# Patient Record
Sex: MALE | Race: WHITE | NOT HISPANIC OR LATINO | Employment: FULL TIME | URBAN - METROPOLITAN AREA
[De-identification: names, ages, dates, MRNs, and addresses within clinical notes are randomized per-mention and may not be internally consistent; named-entity substitution may affect disease eponyms.]

---
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
## 2019-10-24 ENCOUNTER — OFFICE VISIT (OUTPATIENT)
Dept: URGENT CARE | Facility: CLINIC | Age: 19
End: 2019-10-24
Payer: COMMERCIAL

## 2019-10-24 VITALS
HEIGHT: 68 IN | SYSTOLIC BLOOD PRESSURE: 126 MMHG | RESPIRATION RATE: 20 BRPM | HEART RATE: 92 BPM | WEIGHT: 188.6 LBS | DIASTOLIC BLOOD PRESSURE: 78 MMHG | BODY MASS INDEX: 28.58 KG/M2 | OXYGEN SATURATION: 100 % | TEMPERATURE: 98.3 F

## 2019-10-24 DIAGNOSIS — J02.9 EXUDATIVE PHARYNGITIS: Primary | ICD-10-CM

## 2019-10-24 PROCEDURE — 99214 OFFICE O/P EST MOD 30 MIN: CPT | Performed by: PHYSICIAN ASSISTANT

## 2019-10-24 RX ORDER — METHYLPREDNISOLONE 4 MG/1
TABLET ORAL
Qty: 21 TAB | Refills: 0 | Status: SHIPPED
Start: 2019-10-24 | End: 2020-09-14

## 2019-10-24 RX ORDER — AMOXICILLIN AND CLAVULANATE POTASSIUM 875; 125 MG/1; MG/1
1 TABLET, FILM COATED ORAL 2 TIMES DAILY
Qty: 14 TAB | Refills: 0 | Status: SHIPPED | OUTPATIENT
Start: 2019-10-24 | End: 2019-10-31

## 2019-10-24 ASSESSMENT — PATIENT HEALTH QUESTIONNAIRE - PHQ9: CLINICAL INTERPRETATION OF PHQ2 SCORE: 0

## 2019-10-24 NOTE — LETTER
October 24, 2019       Patient: Antonio Briones   YOB: 2000   Date of Visit: 10/24/2019         To Whom It May Concern:    It is my medical opinion that Antonio Briones may be excused from work for the dates of 10/24/19-10/25/19.      If you have any questions or concerns, please don't hesitate to call [phone]          Sincerely,          Wero Castro P.A.-C.  Electronically Signed

## 2019-10-25 NOTE — PROGRESS NOTES
Subjective:      PT is a 19 y.o. male who presents with Pharyngitis (white spots on his left side of tonsils, hard to swallow x 2 days)            HPI  This is a new problem. PT presents to  clinic today complaining of sore throat, with white spots on back of throat, pressure in ears, fatigue, runny nose. PT denies CP, SOB, NVD, abdominal pain, joint pain. PT states these symptoms began around 2 days ago. PT states the pain is a 7/10 with swallowing, aching in nature and worse at night.  Pt has not taken any RX medications for this condition. The pt's medication list, problem list, and allergies have been evaluated and reviewed during today's visit.      PMH:  Negative per pt.      PSH:  Negative per pt.      Fam Hx:  the patient's family history is not pertinent to their current complaint      Soc HX:  Social History     Socioeconomic History   • Marital status: Single     Spouse name: Not on file   • Number of children: Not on file   • Years of education: Not on file   • Highest education level: Not on file   Occupational History   • Not on file   Social Needs   • Financial resource strain: Not on file   • Food insecurity:     Worry: Not on file     Inability: Not on file   • Transportation needs:     Medical: Not on file     Non-medical: Not on file   Tobacco Use   • Smoking status: Current Every Day Smoker   • Smokeless tobacco: Never Used   Substance and Sexual Activity   • Alcohol use: No   • Drug use: Yes     Types: Marijuana   • Sexual activity: Yes     Partners: Male     Birth control/protection: None   Lifestyle   • Physical activity:     Days per week: Not on file     Minutes per session: Not on file   • Stress: Not on file   Relationships   • Social connections:     Talks on phone: Not on file     Gets together: Not on file     Attends Holiness service: Not on file     Active member of club or organization: Not on file     Attends meetings of clubs or organizations: Not on file     Relationship  "status: Not on file   • Intimate partner violence:     Fear of current or ex partner: Not on file     Emotionally abused: Not on file     Physically abused: Not on file     Forced sexual activity: Not on file   Other Topics Concern   • Not on file   Social History Narrative   • Not on file         Medications:    Current Outpatient Medications:   •  amoxicillin-clavulanate (AUGMENTIN) 875-125 MG Tab, Take 1 Tab by mouth 2 times a day for 7 days., Disp: 14 Tab, Rfl: 0  •  methylPREDNISolone (MEDROL DOSEPAK) 4 MG Tablet Therapy Pack, Follow schedule on package instructions., Disp: 21 Tab, Rfl: 0      Allergies:  Patient has no known allergies.    ROS    Constitutional: Positive for malaise/fatigue.   HENT: Positive for congestion and sore throat. Negative for ear pain.    Eyes: Negative for blurred vision, double vision and photophobia.   Respiratory: Positive for cough and sputum production. Negative for hemoptysis, shortness of breath and wheezing.    Cardiovascular: Negative for chest pain and palpitations.   Gastrointestinal: Negative for nausea, vomiting, abdominal pain, diarrhea and constipation.   Genitourinary: Negative for dysuria and flank pain.   Musculoskeletal: Negative for falls and myalgias.   Skin: Negative for itching and rash.   Neurological:  Negative for dizziness and tingling.   Endo/Heme/Allergies: Does not bruise/bleed easily.   Psychiatric/Behavioral: Negative for depression. The patient is not nervous/anxious.           Objective:     /78 (BP Location: Left arm, Patient Position: Sitting, BP Cuff Size: Adult)   Pulse 92   Temp 36.8 °C (98.3 °F) (Temporal)   Resp 20   Ht 1.721 m (5' 7.75\")   Wt 85.5 kg (188 lb 9.6 oz)   SpO2 100%   BMI 28.89 kg/m²      Physical Exam       Constitutional: PT is oriented to person, place, and time. PT appears well-developed and well-nourished. No distress.   HENT:   Head: Normocephalic and atraumatic.   Right Ear: Hearing, tympanic membrane, external " ear and ear canal normal.   Left Ear: Hearing, tympanic membrane, external ear and ear canal normal.   Nose: Mucosal edema, rhinorrhea and sinus tenderness present. Right sinus exhibits frontal sinus tenderness. Left sinus exhibits frontal sinus tenderness.   Mouth/Throat: Uvula is midline. Mucous membranes are pale. Posterior oropharyngeal edema and posterior oropharyngeal erythema with exudate noted on exam.   Eyes: Conjunctivae normal and EOM are normal. Pupils are equal, round, and reactive to light.   Neck: Normal range of motion. Neck supple. No thyromegaly present.   Cardiovascular: Normal rate, regular rhythm, normal heart sounds and intact distal pulses.  Exam reveals no gallop and no friction rub.    No murmur heard.  Pulmonary/Chest: Effort normal and breath sounds normal. No respiratory distress. PT has no wheezes. PT has no rales. PT exhibits no tenderness.   Abdominal: Soft. Bowel sounds are normal. PT exhibits no distension and no mass. There is no tenderness. There is no rebound and no guarding.   Musculoskeletal: Normal range of motion. PT exhibits no edema and no tenderness.   Lymphadenopathy:     PT has no cervical adenopathy.   Neurological: PT is alert and oriented to person, place, and time. PT displays normal reflexes. No cranial nerve deficit. PT exhibits normal muscle tone. Coordination normal.   Skin: Skin is warm and dry. No rash noted. No erythema.   Psychiatric: PT has a normal mood and affect. PT behavior is normal. Judgment and thought content normal.        Assessment/Plan:     1. Exudative pharyngitis  Back-up abx if symptoms do not improve in 2-3 days. PT on clinical presentation has exudate on oropharynx and it's possible beyond the strep A testing that this could be a different type of strep (C/G) or A. haemolyticum     - POCT Rapid Strep A-->NEG  - POCT Mononucleosis (mono)-->NEG  - amoxicillin-clavulanate (AUGMENTIN) 875-125 MG Tab; Take 1 Tab by mouth 2 times a day for 7 days.   Dispense: 14 Tab; Refill: 0  - methylPREDNISolone (MEDROL DOSEPAK) 4 MG Tablet Therapy Pack; Follow schedule on package instructions.  Dispense: 21 Tab; Refill: 0      Rest, fluids encouraged.  OTC decongestant for congestion  Note given for work.  AVS with medical info given.  Pt was in full understanding and agreement with the plan.  Differential diagnosis, natural history, supportive care, and indications for immediate follow-up discussed. All questions answered. Patient agrees with the plan of care.  Follow-up as needed if symptoms worsen or fail to improve.

## 2020-03-29 ENCOUNTER — OFFICE VISIT (OUTPATIENT)
Dept: URGENT CARE | Facility: CLINIC | Age: 20
End: 2020-03-29
Payer: COMMERCIAL

## 2020-03-29 VITALS
HEIGHT: 68 IN | WEIGHT: 153 LBS | SYSTOLIC BLOOD PRESSURE: 120 MMHG | DIASTOLIC BLOOD PRESSURE: 68 MMHG | HEART RATE: 101 BPM | OXYGEN SATURATION: 95 % | RESPIRATION RATE: 16 BRPM | TEMPERATURE: 98.8 F | BODY MASS INDEX: 23.19 KG/M2

## 2020-03-29 DIAGNOSIS — R05.9 COUGH: ICD-10-CM

## 2020-03-29 DIAGNOSIS — J02.9 SORE THROAT: ICD-10-CM

## 2020-03-29 DIAGNOSIS — J06.9 ACUTE URI: ICD-10-CM

## 2020-03-29 LAB
INT CON NEG: NEGATIVE
INT CON POS: POSITIVE
S PYO AG THROAT QL: NEGATIVE

## 2020-03-29 PROCEDURE — 99214 OFFICE O/P EST MOD 30 MIN: CPT | Performed by: NURSE PRACTITIONER

## 2020-03-29 PROCEDURE — 87880 STREP A ASSAY W/OPTIC: CPT | Performed by: NURSE PRACTITIONER

## 2020-03-29 ASSESSMENT — ENCOUNTER SYMPTOMS
RHINORRHEA: 1
CHILLS: 1
DIARRHEA: 1
SPUTUM PRODUCTION: 1
COUGH: 1
SINUS PAIN: 1

## 2020-03-29 NOTE — PROGRESS NOTES
Subjective:      Antonio Briones is a 19 y.o. male who presents with Diarrhea (sore throat, nausea, runny nose, diarrhea, couple days)    History reviewed. No pertinent past medical history.  Social History     Socioeconomic History   • Marital status: Single     Spouse name: Not on file   • Number of children: Not on file   • Years of education: Not on file   • Highest education level: Not on file   Occupational History   • Not on file   Social Needs   • Financial resource strain: Not on file   • Food insecurity     Worry: Not on file     Inability: Not on file   • Transportation needs     Medical: Not on file     Non-medical: Not on file   Tobacco Use   • Smoking status: Current Every Day Smoker   • Smokeless tobacco: Never Used   Substance and Sexual Activity   • Alcohol use: No   • Drug use: Yes     Types: Marijuana   • Sexual activity: Yes     Partners: Male     Birth control/protection: None   Lifestyle   • Physical activity     Days per week: Not on file     Minutes per session: Not on file   • Stress: Not on file   Relationships   • Social connections     Talks on phone: Not on file     Gets together: Not on file     Attends Holiness service: Not on file     Active member of club or organization: Not on file     Attends meetings of clubs or organizations: Not on file     Relationship status: Not on file   • Intimate partner violence     Fear of current or ex partner: Not on file     Emotionally abused: Not on file     Physically abused: Not on file     Forced sexual activity: Not on file   Other Topics Concern   • Not on file   Social History Narrative   • Not on file     History reviewed. No pertinent family history.    Allergies: Patient has no known allergies.    Patient is a 19-year-old male who presents today with complaint of mild sore throat with nasal congestion and dry cough and malaise.  Denies fever, body aches, or chills.  No shortness of breath.  He has had 2 episodes of diarrhea since  "the overall onset of symptoms which was 48 hours ago.  He denies any abdominal pain or cramping with this.  No nausea or vomiting.  He has been able to hydrate and is tolerating bland diet.        URI    This is a new problem. The current episode started in the past 7 days. The problem has been unchanged. There has been no fever. Associated symptoms include congestion, coughing, diarrhea, rhinorrhea and sinus pain. He has tried nothing for the symptoms. The treatment provided no relief.       Review of Systems   Constitutional: Positive for chills and malaise/fatigue.   HENT: Positive for congestion, rhinorrhea and sinus pain.    Respiratory: Positive for cough and sputum production.    Gastrointestinal: Positive for diarrhea.   Skin: Negative.    All other systems reviewed and are negative.         Objective:     /68   Pulse (!) 101   Temp 37.1 °C (98.8 °F) (Temporal)   Resp 16   Ht 1.727 m (5' 8\")   Wt 69.4 kg (153 lb)   SpO2 95%   BMI 23.26 kg/m²      Physical Exam  Constitutional:       Appearance: Normal appearance.   HENT:      Head: Normocephalic and atraumatic.      Right Ear: Tympanic membrane, ear canal and external ear normal.      Left Ear: Tympanic membrane, ear canal and external ear normal.      Nose: Congestion present.      Comments: Light yellow postnasal drainage noted.  No tenderness over the frontal or maxillary sinuses.     Mouth/Throat:      Mouth: Mucous membranes are moist.   Eyes:      Extraocular Movements: Extraocular movements intact.      Conjunctiva/sclera: Conjunctivae normal.      Pupils: Pupils are equal, round, and reactive to light.   Neck:      Musculoskeletal: Normal range of motion and neck supple.   Cardiovascular:      Rate and Rhythm: Normal rate and regular rhythm.      Heart sounds: Normal heart sounds.   Pulmonary:      Effort: Pulmonary effort is normal. No respiratory distress.      Breath sounds: Normal breath sounds. No stridor. No wheezing, rhonchi or " rales.   Chest:      Chest wall: No tenderness.   Musculoskeletal: Normal range of motion.   Skin:     General: Skin is warm and dry.   Neurological:      Mental Status: He is alert and oriented to person, place, and time.   Psychiatric:         Mood and Affect: Mood normal.         Behavior: Behavior normal.         Thought Content: Thought content normal.         Judgment: Judgment normal.       Point-of-care strep: Negative          Assessment/Plan:     1. Cough  2. Acute URI    Warm salt water gargles  Humidifier  Tylenol/Motrin as needed  Push fluids  Schuyler diet  Over-the-counter cough/cold medication of choice  Return for developing shortness of breath, worsening symptoms  Strict ER precautions for respiratory distress

## 2020-07-31 ENCOUNTER — TELEMEDICINE (OUTPATIENT)
Dept: TELEHEALTH | Facility: TELEMEDICINE | Age: 20
End: 2020-07-31
Payer: COMMERCIAL

## 2020-07-31 DIAGNOSIS — J02.9 PHARYNGITIS, UNSPECIFIED ETIOLOGY: ICD-10-CM

## 2020-07-31 PROCEDURE — 99203 OFFICE O/P NEW LOW 30 MIN: CPT | Mod: 95,CR | Performed by: PHYSICIAN ASSISTANT

## 2020-07-31 RX ORDER — PREDNISONE 10 MG/1
40 TABLET ORAL DAILY
Qty: 12 TAB | Refills: 0 | Status: SHIPPED | OUTPATIENT
Start: 2020-07-31 | End: 2020-08-03

## 2020-07-31 SDOH — HEALTH STABILITY: MENTAL HEALTH: HOW OFTEN DO YOU HAVE A DRINK CONTAINING ALCOHOL?: NEVER

## 2020-07-31 NOTE — PROGRESS NOTES
Telemedicine Visit: New Patient     This encounter was conducted via Zoom .   Verbal consent was obtained. Patient's identity was verified.    Subjective:     CC: sore throat  Antonio Briones is a 19 y.o. male presenting to establish care and to discuss the evaluation and management of three days of sore throat. It's been gradually progressing from mild to a moderate now.  Odynophagia to food, liquids.  No other constitutional symptoms.    ROS  See HPI  Constitutional: Negative for fever, chills and malaise/fatigue.   HENT: Negative for congestion.    Eyes: Negative for pain.   Respiratory: Negative for cough and shortness of breath.    Cardiovascular: Negative for leg swelling.   Gastrointestinal: Negative for nausea, vomiting, abdominal pain and diarrhea.   Genitourinary: Negative for dysuria and hematuria.   Skin: Negative for rash.   Neurological: Negative for dizziness, focal weakness and headaches.   Endo/Heme/Allergies: Does not bruise/bleed easily.   Psychiatric/Behavioral: Negative for depression.  The patient is not nervous/anxious.      Not on File    Current medicines (including changes today)  Current Outpatient Medications   Medication Sig Dispense Refill   • predniSONE (DELTASONE) 10 MG Tab Take 4 Tabs by mouth every day for 3 days. 12 Tab 0     No current facility-administered medications for this visit.        He  has no past medical history on file.  He  has no past surgical history on file.      History reviewed. No pertinent family history.  No family status information on file.       There are no active problems to display for this patient.         Objective:   There were no vitals taken for this visit.    Physical Exam:  Constitutional: Alert, no distress, well-groomed.  Skin: No rashes in visible areas.  Eye: Round. Conjunctiva clear, lids normal. No icterus.   ENMT: Lips pink without lesions, good dentition, moist mucous membranes. Phonation normal.  Neck: No masses, no thyromegaly. Moves  freely without pain.  CV: Pulse as reported by patient  Respiratory: Unlabored respiratory effort, no cough or audible wheeze  Psych: Alert and oriented x3, normal affect and mood.       Assessment and Plan:   The following treatment plan was discussed:     1. Pharyngitis, unspecified etiology  - predniSONE (DELTASONE) 10 MG Tab; Take 4 Tabs by mouth every day for 3 days.  Dispense: 12 Tab; Refill: 0    Age and presentation unlikely bacterial.  Will follow TOAST trial and give steroids.  Given return precautions for PTA/RPA if not improving in 48-72 hours needs to RTC    Follow-up: No follow-ups on file.

## 2020-09-04 ENCOUNTER — OFFICE VISIT (OUTPATIENT)
Dept: URGENT CARE | Facility: CLINIC | Age: 20
End: 2020-09-04
Payer: COMMERCIAL

## 2020-09-04 VITALS
DIASTOLIC BLOOD PRESSURE: 88 MMHG | WEIGHT: 200 LBS | HEIGHT: 68 IN | TEMPERATURE: 97.4 F | OXYGEN SATURATION: 99 % | SYSTOLIC BLOOD PRESSURE: 126 MMHG | HEART RATE: 83 BPM | BODY MASS INDEX: 30.31 KG/M2 | RESPIRATION RATE: 16 BRPM

## 2020-09-04 DIAGNOSIS — H10.31 ACUTE CONJUNCTIVITIS OF RIGHT EYE, UNSPECIFIED ACUTE CONJUNCTIVITIS TYPE: ICD-10-CM

## 2020-09-04 PROCEDURE — 99214 OFFICE O/P EST MOD 30 MIN: CPT | Performed by: NURSE PRACTITIONER

## 2020-09-04 RX ORDER — ERYTHROMYCIN 5 MG/G
1 OINTMENT OPHTHALMIC 4 TIMES DAILY
Qty: 3.5 G | Refills: 0 | Status: SHIPPED | OUTPATIENT
Start: 2020-09-04 | End: 2020-09-11

## 2020-09-04 ASSESSMENT — ENCOUNTER SYMPTOMS
FEVER: 0
BLURRED VISION: 1
EYE PAIN: 1
PHOTOPHOBIA: 1
EYE REDNESS: 1
EYE DISCHARGE: 0
EYE ITCHING: 0
DOUBLE VISION: 1
FOREIGN BODY SENSATION: 0

## 2020-09-05 NOTE — PROGRESS NOTES
"  Subjective:     Antonio Briones is a 19 y.o. male who presents for Eye Pain (redness and painfull, started last night )      Denies injury from foreign body. Pain, pressure in eye. Eyelid wender to touch initially. No itch. No exudate. Watery. Took Excedrine. Hx of right lazy eye, intermittent double vision, chronic. Slightly blurry, \"lazier more often than usual\".  No hx of similar symptoms.    Eye Injury   The right eye is affected. This is a new problem. The current episode started yesterday. The problem has been gradually worsening. There was no injury mechanism. The pain is at a severity of 0/10. The patient is experiencing no pain. There is no known exposure to pink eye. He does not wear contacts. Associated symptoms include blurred vision, double vision, eye redness and photophobia. Pertinent negatives include no eye discharge, fever, foreign body sensation, itching or recent URI. The treatment provided significant relief.       History reviewed. No pertinent past medical history.    History reviewed. No pertinent surgical history.    Social History     Socioeconomic History   • Marital status: Single     Spouse name: Not on file   • Number of children: Not on file   • Years of education: Not on file   • Highest education level: Not on file   Occupational History   • Not on file   Social Needs   • Financial resource strain: Not on file   • Food insecurity     Worry: Not on file     Inability: Not on file   • Transportation needs     Medical: Not on file     Non-medical: Not on file   Tobacco Use   • Smoking status: Current Every Day Smoker     Packs/day: 0.15     Years: 2.00     Pack years: 0.30     Types: Cigarettes   • Smokeless tobacco: Never Used   Substance and Sexual Activity   • Alcohol use: Never     Frequency: Never   • Drug use: Yes     Frequency: 7.0 times per week     Types: Marijuana   • Sexual activity: Yes     Partners: Male     Birth control/protection: None   Lifestyle   • Physical " "activity     Days per week: Not on file     Minutes per session: Not on file   • Stress: Not on file   Relationships   • Social connections     Talks on phone: Not on file     Gets together: Not on file     Attends Restoration service: Not on file     Active member of club or organization: Not on file     Attends meetings of clubs or organizations: Not on file     Relationship status: Not on file   • Intimate partner violence     Fear of current or ex partner: Not on file     Emotionally abused: Not on file     Physically abused: Not on file     Forced sexual activity: Not on file   Other Topics Concern   • Not on file   Social History Narrative    ** Merged History Encounter **             History reviewed. No pertinent family history.     No Known Allergies    Review of Systems   Constitutional: Negative for fever.   Eyes: Positive for blurred vision, double vision, photophobia, pain and redness. Negative for discharge and itching.   Neurological: Negative for focal weakness and headaches.   All other systems reviewed and are negative.       Objective:   /88   Pulse 83   Temp 36.3 °C (97.4 °F) (Temporal)   Resp 16   Ht 1.727 m (5' 8\")   Wt 90.7 kg (200 lb)   SpO2 99%   BMI 30.41 kg/m²     Physical Exam  Vitals signs reviewed.   Constitutional:       General: He is not in acute distress.     Appearance: He is well-developed.   HENT:      Head: Normocephalic and atraumatic.      Right Ear: External ear normal.      Left Ear: External ear normal.      Nose: Nose normal.   Eyes:      General: Lids are normal.         Right eye: No foreign body, discharge or hordeolum.      Extraocular Movements:      Right eye: Abnormal extraocular motion present. No nystagmus.      Left eye: Normal extraocular motion and no nystagmus.      Conjunctiva/sclera:      Right eye: Right conjunctiva is injected. No chemosis, exudate or hemorrhage.     Pupils: Pupils are equal, round, and reactive to light.      Right eye: No " "corneal abrasion or fluorescein uptake.      Comments: Slight lateral deviation of right eye initially with EOM.    Neck:      Musculoskeletal: Normal range of motion.   Cardiovascular:      Rate and Rhythm: Normal rate.   Pulmonary:      Effort: Pulmonary effort is normal.   Musculoskeletal: Normal range of motion.   Skin:     General: Skin is warm and dry.      Findings: No rash.   Neurological:      General: No focal deficit present.      Mental Status: He is alert and oriented to person, place, and time.      GCS: GCS eye subscore is 4. GCS verbal subscore is 5. GCS motor subscore is 6.   Psychiatric:         Mood and Affect: Mood normal.         Speech: Speech normal.         Behavior: Behavior normal.         Thought Content: Thought content normal.         Judgment: Judgment normal.         Assessment/Plan:   1. Acute conjunctivitis of right eye, unspecified acute conjunctivitis type  - REFERRAL TO OPHTHALMOLOGY  - erythromycin 5 MG/GM Ointment; Place 1 Application in right eye 4 times a day for 7 days.  Dispense: 3.5 g; Refill: 0  -Visual acuity.    Decrease in vision or right eye. Patient states it's been some time since he's had an eye exam; sating vision was 20/40 \"a while ago\". Had not noticed a significant change in right eye vision. Discussed concerns with acute red eye, no corneal abrasion noted, reported pressure in eye, with noted difference in vision. Discussed following up emergently vs next day with ophthalmology. Risks and benefits. Patient agrees to follow up with a specialist in the morning.    Follow up emergently for any worsening symptoms: increased redness or swelling, vision changes, decreased eye movement, or increased pain.    Differential diagnosis, natural history, supportive care, and indications for immediate follow-up discussed.  "

## 2020-09-05 NOTE — PATIENT INSTRUCTIONS
"Conjunctivitis  Conjunctivitis is commonly called \"pink eye.\" Conjunctivitis can be caused by bacterial or viral infection, allergies, or injuries. There is usually redness of the lining of the eye, itching, discomfort, and sometimes discharge. There may be deposits of matter along the eyelids. A viral infection usually causes a watery discharge, while a bacterial infection causes a yellowish, thick discharge. Pink eye is very contagious and spreads by direct contact.  You may be given antibiotic eyedrops as part of your treatment. Before using your eye medicine, remove all drainage from the eye by washing gently with warm water and cotton balls. Continue to use the medication until you have awakened 2 mornings in a row without discharge from the eye. Do not rub your eye. This increases the irritation and helps spread infection. Use separate towels from other household members. Wash your hands with soap and water before and after touching your eyes. Use cold compresses to reduce pain and sunglasses to relieve irritation from light. Do not wear contact lenses or wear eye makeup until the infection is gone.  SEEK MEDICAL CARE IF:   · Your symptoms are not better after 3 days of treatment.  · You have increased pain or trouble seeing.  · The outer eyelids become very red or swollen.  Document Released: 01/25/2006 Document Revised: 03/11/2013 Document Reviewed: 12/18/2006  [employer]® Patient Information ©2014 [employer] acquired by [employer].    Iritis  Iritis is an inflammation of the colored part of the eye (iris). Other parts at the front of the eye may also be inflamed. The iris is part of the middle layer of the eyeball which is called the uvea or the uveal track. Any part of the uveal track can become inflamed. The other portions of the uveal track are the choroid (the thin membrane under the outer layer of the eye), and the ciliary body (joins the choroid and the iris and produces the fluid in the front of the eye).   It is " "extremely important to treat iritis early, as it may lead to internal eye damage causing scarring or diseases such as glaucoma. Some people have only one attack of iritis (in one or both eyes) in their lifetime, while others may get it many times.  CAUSES  Iritis can be associated with many different diseases, but mostly occurs in otherwise healthy people. Examples of diseases that can be associated with iritis include:  · Diseases where the body's immune system attacks tissues within your own body (autoimmune diseases).  · Infections (tuberculosis, gonorrhea, fungus infections, Lyme disease, infection of the lining of the heart).  · Trauma or injury.  · Eye diseases (acute glaucoma and others).  · Inflammation from other parts of the uveal track.  · Severe eye infections.  · Other rare diseases.  SYMPTOMS  · Eye pain or aching.  · Sensitivity to light.  · Loss of sight or blurred vision.  · Redness of the eye. This is often accompanied by a ring of redness around the outside of the cornea, or clear covering at the front of the eye (ciliary flush).  · Excessive tearing of the eye(s).  · A small pupil that does not enlarge in the dark and stays smaller than the other eye's pupil.  · A whitish area that obscures the lower part of the colored circular iris. Sometimes this is visible when looking at the eye, where the whitish area has a \"fluid level\" or flat top. This is called a \"hypopyon\" and is actually pus inside the eye.  Since iritis causes the eye to become red, it is often confused with a much less dangerous form of \"pink eye\" or conjunctivitis. One of the most important symptoms is sensitivity to light. Anytime there is redness, discomfort in the eye(s) and extreme light sensitivity, it is extremely important to see an ophthalmologist as soon as possible.  TREATMENT  Acute iritis requires prompt medical evaluation by an eye specialist (ophthalmologist.) Treatment depends on the underlying cause but may " include:  · Corticosteroid eye drops and dilating eye drops. Follow your caregiver's exact instructions on taking and stopping corticosteroid medications (drops or pills).  · Occasionally, the iritis will be so severe that it will not respond to commonly used medications. If this happens, it may be necessary to use steroid injections. The injections are given under the eye's outer surface. Sometimes oral medications are given. The decision on treatment used for iritis is usually made on an individual basis.  HOME CARE INSTRUCTIONS  Your care giver will give specific instructions regarding the use of eye medications or other medications. Be certain to follow all instructions in both taking and stopping the medications.  SEEK IMMEDIATE MEDICAL CARE IF:  · You have redness of one or both eye.  · You experience a great deal of light sensitivity.  · You have pain or aching in either eye.  MAKE SURE YOU:   · Understand these instructions.  · Will watch your condition.  · Will get help right away if you are not doing well or get worse.  Document Released: 12/18/2006 Document Revised: 03/11/2013 Document Reviewed: 06/06/2008  [employer]® Patient Information ©2014 [employer].

## 2020-09-06 ASSESSMENT — ENCOUNTER SYMPTOMS
HEADACHES: 0
FOCAL WEAKNESS: 0

## 2020-09-14 ENCOUNTER — HOSPITAL ENCOUNTER (EMERGENCY)
Facility: MEDICAL CENTER | Age: 20
End: 2020-09-15
Attending: EMERGENCY MEDICINE
Payer: COMMERCIAL

## 2020-09-14 DIAGNOSIS — H57.11 EYE PAIN, RIGHT: ICD-10-CM

## 2020-09-14 DIAGNOSIS — H53.8 BLURRED VISION: ICD-10-CM

## 2020-09-14 PROCEDURE — 700101 HCHG RX REV CODE 250: Performed by: EMERGENCY MEDICINE

## 2020-09-14 PROCEDURE — 99283 EMERGENCY DEPT VISIT LOW MDM: CPT

## 2020-09-14 RX ORDER — PROPARACAINE HYDROCHLORIDE 5 MG/ML
2 SOLUTION/ DROPS OPHTHALMIC ONCE
Status: COMPLETED | OUTPATIENT
Start: 2020-09-15 | End: 2020-09-14

## 2020-09-14 RX ORDER — PROPARACAINE HYDROCHLORIDE 5 MG/ML
1 SOLUTION/ DROPS OPHTHALMIC ONCE
Status: CANCELLED | OUTPATIENT
Start: 2020-09-15 | End: 2020-09-15

## 2020-09-14 RX ADMIN — FLUORESCEIN SODIUM 1 MG: 1 STRIP OPHTHALMIC at 23:53

## 2020-09-14 RX ADMIN — PROPARACAINE HYDROCHLORIDE 2 DROP: 5 SOLUTION/ DROPS OPHTHALMIC at 23:52

## 2020-09-14 ASSESSMENT — ENCOUNTER SYMPTOMS
EYE PAIN: 1
BLURRED VISION: 1
PHOTOPHOBIA: 1
EYE REDNESS: 1

## 2020-09-15 VITALS
WEIGHT: 207.01 LBS | HEIGHT: 68 IN | TEMPERATURE: 97.7 F | HEART RATE: 71 BPM | DIASTOLIC BLOOD PRESSURE: 76 MMHG | BODY MASS INDEX: 31.37 KG/M2 | OXYGEN SATURATION: 97 % | SYSTOLIC BLOOD PRESSURE: 138 MMHG | RESPIRATION RATE: 18 BRPM

## 2020-09-15 ASSESSMENT — ENCOUNTER SYMPTOMS
HEADACHES: 0
VOMITING: 0
DIZZINESS: 0
FEVER: 0
NAUSEA: 0

## 2020-09-15 NOTE — ED TRIAGE NOTES
"Chief Complaint   Patient presents with   • Eye Pain     red and irritated for the past week. patient was seen at urgent care on friday and was given ABX eye drops with no relief of symptoms. patient was told to come to the ER for worsening pain. eye is sensitive to light   • Blurred Vision     x 2 days     Pt amb to triage with steady gait for above complaint.     Pt is alert and oriented, speaking in full sentences, follows commands and responds appropriately to questions. NAD. Resp are even and unlabored.     Pt placed in lobby. Pt educated on triage process. Pt encouraged to alert staff for any changes.    Blood Pressure: 141/88, Pulse: 77, Respiration: 16, Temperature: 36.4 °C (97.5 °F), Height: 172.7 cm (5' 8\"), Weight: 93.9 kg (207 lb 0.2 oz), BMI (Calculated): 31.48, BSA (Calculated): 2.1, Pulse Oximetry: 98 %      "

## 2020-09-15 NOTE — ED NOTES
Pt discharged home. Explained discharge instructions. Questions and comments addressed. Pt verbalized understanding of instructions. Pt advised to follow-up with Ophthalmologist or return to ED for any new or worsening of symptoms. Pt is ambulating well and steady on feet. VS stable. Pt's friend at bedside and will be driving pt home.

## 2020-09-15 NOTE — ED PROVIDER NOTES
ED Provider Note    Scribed for Violet Dennison M.D. by Radha Payne. 9/14/2020, 11:44 PM.    Means of arrival: Walk-in  History obtained from: Patient   History limited by: None     CHIEF COMPLAINT  Chief Complaint   Patient presents with   • Eye Pain     red and irritated for the past week. patient was seen at urgent care on friday and was given ABX eye drops with no relief of symptoms. patient was told to come to the ER for worsening pain. eye is sensitive to light   • Blurred Vision     x 2 days     PPE Note: I personally donned full PPE for all patient encounters during this visit, including wearing an N95 respirator mask and gloves. Scribe remained outside the patient's room and did not have any contact with the patient for the duration of patient encounter.      SHAY Briones is a 19 y.o. male who presents to the Emergency Department for right eye irritation onset 2 weeks ago. He has associated redness, photophobia, and blurred vision in the right eye. He was seen at urgent care  and was given erythromycin ointment for conjunctivitis, he completed the course but this did not alleviate his pain. He states he has prescription glasses for distance, but he does not wear them. He has a history of strabismus in the right eye, has never had eye surgery.  He does not wear contacts.  He denies pain with movement of the eye. He also denies being exposed to any chemicals.  He does not work with metals and denies foreign body sensation.  He does not believe that he got anything in his eye when the discomfort started.  Denies any eye trauma.      REVIEW OF SYSTEMS  Review of Systems   Constitutional: Negative for fever.   Eyes: Positive for blurred vision, photophobia, pain (irritation) and redness.   Gastrointestinal: Negative for nausea and vomiting.   Neurological: Negative for dizziness and headaches.   All other systems reviewed and are negative.    PAST MEDICAL HISTORY   None pertinent.     SURGICAL  "HISTORY  patient denies any surgical history    SOCIAL HISTORY  Social History     Tobacco Use   • Smoking status: Current Every Day Smoker     Packs/day: 0.15     Years: 2.00     Pack years: 0.30     Types: Cigarettes   • Smokeless tobacco: Never Used   Substance Use Topics   • Alcohol use: Never     Frequency: Never   • Drug use: Yes     Frequency: 7.0 times per week     Types: Marijuana      Social History     Substance and Sexual Activity   Drug Use Yes   • Frequency: 7.0 times per week   • Types: Marijuana       FAMILY HISTORY  History reviewed. No pertinent family history.    CURRENT MEDICATIONS  Home Medications     Reviewed by Jesika Tee R.N. (Registered Nurse) on 09/14/20 at 2255  Med List Status: Complete   Medication Last Dose Status        Patient Lemuel Taking any Medications                       ALLERGIES  No Known Allergies    PHYSICAL EXAM  VITAL SIGNS: /88   Pulse 77   Temp 36.4 °C (97.5 °F) (Oral)   Resp 16   Ht 1.727 m (5' 8\")   Wt 93.9 kg (207 lb 0.2 oz)   SpO2 98%   BMI 31.48 kg/m²   Vitals reviewed by myself.  Physical Exam  Nursing note and vitals reviewed.  Constitutional: Well-developed and well-nourished. No acute distress.   HENT: Head is normocephalic and atraumatic.  Eyes: Patient has intact extraocular movements, no pain with extraocular movements, pupils are equal and reactive to light bilaterally, visual acuity is 20/20 left eye, 20/100 right eye and 20/15 both eyes.  Right conjunctiv is injected and erythematous.  Fluorescein staining reveals no uptake of the dye, negative Lorenzo sign.  Pressure is 20 in the right eye and 19 in the left eye.  Cardiovascular: regular rate and  regular rhythm. No murmur heard.  Pulmonary/Chest: Breath sounds normal.  Normal effort  Musculoskeletal: Extremities exhibit normal range of motion without edema or tenderness.   Neurological: Awake and alert  Skin: Skin is warm and dry.     REASSESSMENT    11:50 PM - Evaluated patient at " bedside. Treated patient for pain.      12:10 AM -  I discussed the patient's case and the above findings with Dr. Paulino (Opthamology) who recommended discharge at this time and to follow up outpatient.     12:14 AM - Patient was reevaluated at bedside. Discussed my conversation with Dr. Paulino. I also discussed with the patient the plan for discharge at this time. Patient verbalizes understanding and agreement to this plan of care.     COURSE & MEDICAL DECISION MAKING  Nursing notes, VS, PMSFHx reviewed in chart.    Patient is a 19-year-old male who comes in for evaluation of right eye pain.  Differential diagnosis includes conjunctivitis, uveitis, iritis, scleritis, episcleritis.  On my exam patient is well-appearing with vitals in normal limits.  Visual acuity is noted to be decreased in the right eye, however eye exam is otherwise unremarkable.  Patient's light sensitivity and eye discomfort resolved with proparacaine administration.  Patient has no pain with extraocular movements making periorbital cellulitis unlikely.  He has no trauma to the eye making iritis less likely.  Discussed the case with Dr. Paulino from ophthalmology who does not advise starting medication at this time.  He will see patient in clinic tomorrow and initiate therapy then.  In the meantime patient is advised to use refresh moisturizing drops for any discomfort and over-the-counter analgesics.  Patient is amenable to this plan.  He is given strict return precautions and discharged in stable condition.    The patient will return for new or worsening symptoms and is stable at the time of discharge.    The patient is referred to a primary physician for blood pressure management, diabetic screening, and for all other preventative health concerns.    DISPOSITION:  Patient will be discharged home in stable condition.    FOLLOW UP:  Cliff Paulino M.D.  [address]  [place] [address]  [phone]        FINAL IMPRESSION  1. Blurred  vision    2. Eye pain, right          IRadha (Scribe), am scribing for, and in the presence of, Violet Dennison M.D..    Electronically signed by: Radha Payne (Scribe), 9/14/2020    IViolet M.D. personally performed the services described in this documentation, as scribed by Radha Payne in my presence, and it is both accurate and complete.    The note accurately reflects work and decisions made by me.  Violet Dennison M.D.  9/15/2020  2:04 AM

## 2020-09-15 NOTE — ED NOTES
Pt ambulatory to [facility]. Pt changed into gown and placed on monitor.  Agree with triage note. Pt c/o pain and irritation to RIGHT eye, sclera noted to be reddened. Pt report waking up with RIGHT eye irritation, denies any trauma or known foreign object in eye.   Visual acuity completed: LEFT- 20/20 RIGHT-90/100 Both eyes- 20/15  Chart up and ready for ERP now.

## 2021-04-17 ENCOUNTER — OFFICE VISIT (OUTPATIENT)
Dept: URGENT CARE | Facility: CLINIC | Age: 21
End: 2021-04-17
Payer: COMMERCIAL

## 2021-04-17 VITALS
SYSTOLIC BLOOD PRESSURE: 128 MMHG | HEART RATE: 87 BPM | TEMPERATURE: 98.6 F | WEIGHT: 197 LBS | BODY MASS INDEX: 29.18 KG/M2 | HEIGHT: 69 IN | DIASTOLIC BLOOD PRESSURE: 88 MMHG | RESPIRATION RATE: 16 BRPM | OXYGEN SATURATION: 100 %

## 2021-04-17 DIAGNOSIS — H10.32 ACUTE CONJUNCTIVITIS OF LEFT EYE, UNSPECIFIED ACUTE CONJUNCTIVITIS TYPE: ICD-10-CM

## 2021-04-17 PROCEDURE — 99214 OFFICE O/P EST MOD 30 MIN: CPT | Performed by: PHYSICIAN ASSISTANT

## 2021-04-17 RX ORDER — POLYMYXIN B SULFATE AND TRIMETHOPRIM 1; 10000 MG/ML; [USP'U]/ML
1 SOLUTION OPHTHALMIC EVERY 4 HOURS
Qty: 10 ML | Refills: 0 | Status: SHIPPED | OUTPATIENT
Start: 2021-04-17 | End: 2022-03-31

## 2021-04-17 ASSESSMENT — ENCOUNTER SYMPTOMS
CHILLS: 0
PHOTOPHOBIA: 1
EYE REDNESS: 1
CARDIOVASCULAR NEGATIVE: 1
BLURRED VISION: 0
EYE DISCHARGE: 1
DOUBLE VISION: 0
EYE PAIN: 1
FEVER: 0
RESPIRATORY NEGATIVE: 1

## 2021-04-17 ASSESSMENT — VISUAL ACUITY: OU: 1

## 2021-04-17 NOTE — PROGRESS NOTES
Subjective:   Antonio Briones is a 20 y.o. male who presents for Eye Problem (x 4 days, L eye pain, watery, pinpoint pupil, photosensitivity, blurry vision.)    HPI  Patient is a 20-year-old male who presents with a red eye onset 3 days ago.  He states initially it was a mild redness and irritation located to the lateral eye with gradual worsening redness, irritation, and clear drainage.  He denies any trauma or injury.  He denies any foreign body sensation.  He does report of some discomfort and occasional pain.  He has some photosensitivity.  His symptoms are somewhat relieved with warm compresses.  He has cloudy vision secondary to drainage but denies any other vision changes.  He does not wear eye contacts.  He usually wears eyeglasses but he does not have them with him here in the clinic.  Denies any fever, chills.  History of similar red eye in the past and he had a full evaluation by an eye doctor which showed no glaucoma or any other concerns.  He was placed on antibiotics at the time and his symptoms resolved.      Review of Systems   Constitutional: Negative for chills and fever.   HENT: Negative for congestion.    Eyes: Positive for photophobia, pain, discharge and redness. Negative for blurred vision and double vision.   Respiratory: Negative.    Cardiovascular: Negative.        Medications:    • This patient does not have an active medication from one of the medication groupers.    Allergies: Patient has no known allergies.    Problem List: Antonio Briones does not have a problem list on file.    Surgical History:  No past surgical history on file.    Past Social Hx: Antonio Briones  reports that he has been smoking cigarettes. He has a 0.30 pack-year smoking history. He has never used smokeless tobacco. He reports current alcohol use. He reports current drug use. Frequency: 7.00 times per week. Drug: Marijuana.     Past Family Hx:  Antonio Briones family history is not on file.  "    Problem list, medications, and allergies reviewed by myself today in Epic.     Objective:     /88 (BP Location: Left arm, Patient Position: Sitting, BP Cuff Size: Adult)   Pulse 87   Temp 37 °C (98.6 °F) (Temporal)   Resp 16   Ht 1.753 m (5' 9\")   Wt 89.4 kg (197 lb)   SpO2 100%   BMI 29.09 kg/m²     Vision Screening on 4/17/2021  Edited by: Wicho Mar, Med Ass't   Right eye Left eye Both eyes   Without correction 20 70 20 20       Vision Screening on 9/4/2020  Edited by: Gardenia Campos, Med Ass't   Right eye Left eye Both eyes   Without correction 20/100 20/15 20/20          Physical Exam  Vitals reviewed.   Constitutional:       General: He is not in acute distress.     Appearance: Normal appearance. He is not ill-appearing or toxic-appearing.   Eyes:      General: Lids are normal. Vision grossly intact.         Left eye: Discharge (clear) present.No foreign body or hordeolum.      Extraocular Movements: Extraocular movements intact.      Conjunctiva/sclera:      Left eye: Left conjunctiva is injected. No chemosis, exudate or hemorrhage.     Pupils: Pupils are equal, round, and reactive to light.      Left eye: No corneal abrasion or fluorescein uptake. Lorenzo exam negative.  Cardiovascular:      Rate and Rhythm: Normal rate.   Pulmonary:      Effort: Pulmonary effort is normal.   Musculoskeletal:      Cervical back: Neck supple.   Lymphadenopathy:      Cervical: No cervical adenopathy.   Skin:     General: Skin is warm and dry.   Neurological:      General: No focal deficit present.      Mental Status: He is alert and oriented to person, place, and time.   Psychiatric:         Mood and Affect: Mood normal.         Behavior: Behavior normal.         Assessment/Associated Orders     1. Acute conjunctivitis of left eye, unspecified acute conjunctivitis type  polymixin-trimethoprim (POLYTRIM) 83257-7.1 UNIT/ML-% Solution       Medical Decision Making      This is a pleasant 20-year-old male who " presents with 3 days of a left red eye with irritation and clear drainage.  No trauma or injury.  Exam shows conjunctivitis with no fluorescein uptake or other signs of corneal abrasion or foreign body.  Visual acuity is non revealing (see above).  Discussed differentials such as bacterial conjunctivitis, viral conjunctivitis, scleritis.     Due to history of similar issue in the past resolved with antibiotics, we will treat with antibiotic eyedrops.  He was instructed to follow-up closely with an eye doctor such as [facility] or the ophthalmologist he had previously seen.  Strict precautions discussed such as if no improvement in 24 to 48 hours or any worsening present immediately to an eye doctor or to the emergency room especially if any vision changes, worsening redness, pain, fevers, chills or any other concerns.    I personally reviewed prior external notes and test results pertinent to today's visit. Red flags discussed and indications to immediately call 911 or present to the Emergency Department.   Supportive care, differential diagnoses, and indications for immediate follow-up discussed with patient.    Patient expresses understanding and agrees to plan. Patient denies any other questions or concerns.     Advised the patient to follow-up with the primary care physician for recheck, reevaluation, and consideration of further management.    My total time spent caring for the patient on the day of the encounter that included review of prior records, obtaining history, examination, discussion of plan and return precautions was at least 30 minutes.     Please note that this dictation was created using voice recognition software. I have made a reasonable attempt to correct obvious errors, but I expect that there are errors of grammar and possibly content that I did not discover before finalizing the note.    This note was electronically signed by Khari Collado PA-C

## 2021-04-19 ENCOUNTER — HOSPITAL ENCOUNTER (EMERGENCY)
Facility: MEDICAL CENTER | Age: 21
End: 2021-04-19
Attending: EMERGENCY MEDICINE
Payer: COMMERCIAL

## 2021-04-19 VITALS
HEIGHT: 69 IN | DIASTOLIC BLOOD PRESSURE: 89 MMHG | OXYGEN SATURATION: 97 % | BODY MASS INDEX: 29.29 KG/M2 | WEIGHT: 197.75 LBS | HEART RATE: 80 BPM | RESPIRATION RATE: 18 BRPM | TEMPERATURE: 97.5 F | SYSTOLIC BLOOD PRESSURE: 138 MMHG

## 2021-04-19 DIAGNOSIS — H57.12 LEFT EYE PAIN: ICD-10-CM

## 2021-04-19 PROCEDURE — 99283 EMERGENCY DEPT VISIT LOW MDM: CPT

## 2021-04-19 PROCEDURE — 700101 HCHG RX REV CODE 250: Performed by: EMERGENCY MEDICINE

## 2021-04-19 RX ORDER — PROPARACAINE HYDROCHLORIDE 5 MG/ML
1 SOLUTION/ DROPS OPHTHALMIC ONCE
Status: COMPLETED | OUTPATIENT
Start: 2021-04-19 | End: 2021-04-19

## 2021-04-19 RX ADMIN — FLUORESCEIN SODIUM 1 MG: 1 STRIP OPHTHALMIC at 06:50

## 2021-04-19 RX ADMIN — PROPARACAINE HYDROCHLORIDE 1 DROP: 5 SOLUTION/ DROPS OPHTHALMIC at 06:49

## 2021-04-19 ASSESSMENT — LIFESTYLE VARIABLES
TOTAL SCORE: 0
HAVE YOU EVER FELT YOU SHOULD CUT DOWN ON YOUR DRINKING: NO
DO YOU DRINK ALCOHOL: YES
EVER HAD A DRINK FIRST THING IN THE MORNING TO STEADY YOUR NERVES TO GET RID OF A HANGOVER: NO
ON A TYPICAL DAY WHEN YOU DRINK ALCOHOL HOW MANY DRINKS DO YOU HAVE: 1
TOTAL SCORE: 0
TOTAL SCORE: 0
AVERAGE NUMBER OF DAYS PER WEEK YOU HAVE A DRINK CONTAINING ALCOHOL: 0
HAVE PEOPLE ANNOYED YOU BY CRITICIZING YOUR DRINKING: NO
HOW MANY TIMES IN THE PAST YEAR HAVE YOU HAD 5 OR MORE DRINKS IN A DAY: 0
CONSUMPTION TOTAL: NEGATIVE
EVER FELT BAD OR GUILTY ABOUT YOUR DRINKING: NO

## 2021-04-19 NOTE — ED NOTES
0700 Report received from vidya  , assumed care at this time.  Pt evaluated , pt to be d/c home  0725 D/c pt home, no rx given . Pt aware of f/u instructions with ophtalmology  , aware to return for any changes or concerns. No further questions upon d/c home from ed

## 2021-04-19 NOTE — DISCHARGE INSTRUCTIONS
Please contact Dr. Lancaster for an eye appointment today.  Return for new or concerning symptoms in the interim.

## 2021-04-19 NOTE — ED TRIAGE NOTES
"Chief Complaint   Patient presents with   • Eye Pain     5 days of worsening left eye pain, redness.     ED Triage Vitals [04/19/21 0637]   Enc Vitals Group      Blood Pressure 144/96      Pulse 82      Respiration 18      Temperature 36.4 °C (97.5 °F)      Temp src Temporal      Pulse Oximetry 98 %      Weight 89.7 kg (197 lb 12 oz)      Height 1.753 m (5' 9\")     "

## 2021-04-19 NOTE — ED PROVIDER NOTES
CHIEF COMPLAINT  Chief Complaint   Patient presents with   • Eye Pain     5 days of worsening left eye pain, redness.       HPI  Antonio Briones is a 20 y.o. male who presents with a red painful eye with minimal clear drainage and minimal eye swelling over the last 5 days.  He was started on antibiotics topically through urgent care a few days ago-he presents with ongoing pain.  He notes some blurred vision on that side.  He does note some photophobia.  He denies any trauma or any obvious foreign body that he was aware of.  Nothing seems to make his symptoms better.    REVIEW OF SYSTEMS  No fever.  No definite foreign body.    PAST MEDICAL HISTORY  History reviewed. No pertinent past medical history.    FAMILY HISTORY  No family history on file.    SOCIAL HISTORY  Social History     Socioeconomic History   • Marital status: Single     Spouse name: Not on file   • Number of children: Not on file   • Years of education: Not on file   • Highest education level: Not on file   Occupational History   • Not on file   Tobacco Use   • Smoking status: Current Every Day Smoker     Packs/day: 0.15     Years: 2.00     Pack years: 0.30     Types: Cigarettes   • Smokeless tobacco: Never Used   Substance and Sexual Activity   • Alcohol use: Yes   • Drug use: Yes     Frequency: 7.0 times per week     Types: Marijuana   • Sexual activity: Yes     Partners: Male     Birth control/protection: None   Other Topics Concern   • Not on file   Social History Narrative    ** Merged History Encounter **          Social Determinants of Health     Financial Resource Strain:    • Difficulty of Paying Living Expenses:    Food Insecurity:    • Worried About Running Out of Food in the Last Year:    • Ran Out of Food in the Last Year:    Transportation Needs:    • Lack of Transportation (Medical):    • Lack of Transportation (Non-Medical):    Physical Activity:    • Days of Exercise per Week:    • Minutes of Exercise per Session:    Stress:   "  • Feeling of Stress :    Social Connections:    • Frequency of Communication with Friends and Family:    • Frequency of Social Gatherings with Friends and Family:    • Attends Gn[employer] Services:    • Active Member of Clubs or Organizations:    • Attends Club or Organization Meetings:    • Marital Status:    Intimate Partner Violence:    • Fear of Current or Ex-Partner:    • Emotionally Abused:    • Physically Abused:    • Sexually Abused:        SURGICAL HISTORY  History reviewed. No pertinent surgical history.    CURRENT MEDICATIONS  Home Medications     Reviewed by Rigo Friend R.N. (Registered Nurse) on 04/19/21 at 0656  Med List Status: <None>   Medication Last Dose Status   polymixin-trimethoprim (POLYTRIM) 90014-6.1 UNIT/ML-% Solution  Active                ALLERGIES  No Known Allergies    PHYSICAL EXAM  VITAL SIGNS: /96   Pulse 82   Temp 36.4 °C (97.5 °F) (Temporal)   Resp 18   Ht 1.753 m (5' 9\")   Wt 89.7 kg (197 lb 12 oz)   SpO2 98%   BMI 29.20 kg/m²      Constitutional: Well developed, Well nourished, No acute distress, Non-toxic appearance.   HENT: Normocephalic, Atraumatic, slit-lamp exam demonstrates and injected conjunctiva, there is a slightly miotic pupil which is minimally reactive, there may be some mild flare in the anterior chamber no definite cells appreciated, no hyphema or hypopyon, fluorescein staining demonstrates no significant uptake, Samy-Pen pressure is 18, extraocular movements are intact  Cardiovascular: Regular pulse  Lungs: No respiratory distress  Skin: Warm, Dry, no rash  Extremities: No edema  Neurologic: Alert, appropriate, follows commands  Psychiatric: Affect normal    COURSE & MEDICAL DECISION MAKING  Pertinent Labs & Imaging studies reviewed. (See chart for details)  This is a 20-year-old male who presents with a red painful eye-visual acuity is 20/50 bilaterally, 20/70 on the right and 20/40 on the left.  He does not have any evidence of glaucoma with " Samy-Pen pressure of 18.  I do not see any evidence of foreign body.  There is no corneal abrasion that I can see.  Iritis is a consideration-the case was discussed with Dr. Lancaster over the phone who agreed to follow-up with the patient.  The patient is already on antibiotics and will continue those.    FINAL IMPRESSION  1. Painful eye  2.   3.         Electronically signed by: Adrian Green M.D., 4/19/2021 6:58 AM

## 2022-03-31 ENCOUNTER — HOSPITAL ENCOUNTER (EMERGENCY)
Facility: MEDICAL CENTER | Age: 22
End: 2022-03-31
Attending: EMERGENCY MEDICINE
Payer: COMMERCIAL

## 2022-03-31 ENCOUNTER — TELEPHONE (OUTPATIENT)
Dept: URGENT CARE | Facility: CLINIC | Age: 22
End: 2022-03-31

## 2022-03-31 ENCOUNTER — HOSPITAL ENCOUNTER (OUTPATIENT)
Dept: LAB | Facility: MEDICAL CENTER | Age: 22
End: 2022-03-31
Attending: FAMILY MEDICINE
Payer: COMMERCIAL

## 2022-03-31 ENCOUNTER — HOSPITAL ENCOUNTER (OUTPATIENT)
Facility: MEDICAL CENTER | Age: 22
End: 2022-03-31
Attending: FAMILY MEDICINE
Payer: COMMERCIAL

## 2022-03-31 ENCOUNTER — OFFICE VISIT (OUTPATIENT)
Dept: URGENT CARE | Facility: CLINIC | Age: 22
End: 2022-03-31
Payer: COMMERCIAL

## 2022-03-31 VITALS
OXYGEN SATURATION: 98 % | DIASTOLIC BLOOD PRESSURE: 80 MMHG | BODY MASS INDEX: 29.25 KG/M2 | SYSTOLIC BLOOD PRESSURE: 120 MMHG | HEIGHT: 68 IN | TEMPERATURE: 97.1 F | HEART RATE: 102 BPM | RESPIRATION RATE: 16 BRPM | WEIGHT: 193 LBS

## 2022-03-31 VITALS
HEIGHT: 66 IN | HEART RATE: 102 BPM | WEIGHT: 191.14 LBS | RESPIRATION RATE: 18 BRPM | TEMPERATURE: 98.6 F | OXYGEN SATURATION: 98 % | BODY MASS INDEX: 30.72 KG/M2 | DIASTOLIC BLOOD PRESSURE: 95 MMHG | SYSTOLIC BLOOD PRESSURE: 141 MMHG

## 2022-03-31 DIAGNOSIS — R80.9 PROTEINURIA, UNSPECIFIED TYPE: ICD-10-CM

## 2022-03-31 DIAGNOSIS — R10.13 INTERMITTENT EPIGASTRIC ABDOMINAL PAIN: ICD-10-CM

## 2022-03-31 DIAGNOSIS — R19.7 DIARRHEA, UNSPECIFIED TYPE: ICD-10-CM

## 2022-03-31 DIAGNOSIS — R68.89 FLU-LIKE SYMPTOMS: ICD-10-CM

## 2022-03-31 LAB
ALBUMIN SERPL BCP-MCNC: 4.6 G/DL (ref 3.2–4.9)
ALBUMIN SERPL BCP-MCNC: 4.7 G/DL (ref 3.2–4.9)
ALBUMIN/GLOB SERPL: 1.2 G/DL
ALBUMIN/GLOB SERPL: 1.5 G/DL
ALP SERPL-CCNC: 65 U/L (ref 30–99)
ALP SERPL-CCNC: 66 U/L (ref 30–99)
ALT SERPL-CCNC: 23 U/L (ref 2–50)
ALT SERPL-CCNC: 24 U/L (ref 2–50)
ANION GAP SERPL CALC-SCNC: 14 MMOL/L (ref 7–16)
ANION GAP SERPL CALC-SCNC: 15 MMOL/L (ref 7–16)
APPEARANCE UR: CLEAR
AST SERPL-CCNC: 23 U/L (ref 12–45)
AST SERPL-CCNC: 26 U/L (ref 12–45)
BASOPHILS # BLD AUTO: 0.5 % (ref 0–1.8)
BASOPHILS # BLD AUTO: 0.5 % (ref 0–1.8)
BASOPHILS # BLD: 0.05 K/UL (ref 0–0.12)
BASOPHILS # BLD: 0.06 K/UL (ref 0–0.12)
BILIRUB SERPL-MCNC: 0.6 MG/DL (ref 0.1–1.5)
BILIRUB SERPL-MCNC: 0.6 MG/DL (ref 0.1–1.5)
BILIRUB UR STRIP-MCNC: NORMAL MG/DL
BUN SERPL-MCNC: 11 MG/DL (ref 8–22)
BUN SERPL-MCNC: 12 MG/DL (ref 8–22)
CALCIUM SERPL-MCNC: 9.3 MG/DL (ref 8.5–10.5)
CALCIUM SERPL-MCNC: 9.7 MG/DL (ref 8.5–10.5)
CHLORIDE SERPL-SCNC: 95 MMOL/L (ref 96–112)
CHLORIDE SERPL-SCNC: 96 MMOL/L (ref 96–112)
CO2 SERPL-SCNC: 26 MMOL/L (ref 20–33)
CO2 SERPL-SCNC: 27 MMOL/L (ref 20–33)
COLOR UR AUTO: NORMAL
CREAT SERPL-MCNC: 0.86 MG/DL (ref 0.5–1.4)
CREAT SERPL-MCNC: 0.89 MG/DL (ref 0.5–1.4)
EOSINOPHIL # BLD AUTO: 0.03 K/UL (ref 0–0.51)
EOSINOPHIL # BLD AUTO: 0.03 K/UL (ref 0–0.51)
EOSINOPHIL NFR BLD: 0.2 % (ref 0–6.9)
EOSINOPHIL NFR BLD: 0.3 % (ref 0–6.9)
ERYTHROCYTE [DISTWIDTH] IN BLOOD BY AUTOMATED COUNT: 40.5 FL (ref 35.9–50)
ERYTHROCYTE [DISTWIDTH] IN BLOOD BY AUTOMATED COUNT: 41.1 FL (ref 35.9–50)
FLUAV+FLUBV AG SPEC QL IA: NEGATIVE
GFR SERPLBLD CREATININE-BSD FMLA CKD-EPI: 124 ML/MIN/1.73 M 2
GFR SERPLBLD CREATININE-BSD FMLA CKD-EPI: 125 ML/MIN/1.73 M 2
GLOBULIN SER CALC-MCNC: 3 G/DL (ref 1.9–3.5)
GLOBULIN SER CALC-MCNC: 3.9 G/DL (ref 1.9–3.5)
GLUCOSE SERPL-MCNC: 101 MG/DL (ref 65–99)
GLUCOSE SERPL-MCNC: 103 MG/DL (ref 65–99)
GLUCOSE UR STRIP.AUTO-MCNC: NEGATIVE MG/DL
HCT VFR BLD AUTO: 45.7 % (ref 42–52)
HCT VFR BLD AUTO: 45.8 % (ref 42–52)
HGB BLD-MCNC: 15.9 G/DL (ref 14–18)
HGB BLD-MCNC: 16 G/DL (ref 14–18)
IMM GRANULOCYTES # BLD AUTO: 0.03 K/UL (ref 0–0.11)
IMM GRANULOCYTES # BLD AUTO: 0.04 K/UL (ref 0–0.11)
IMM GRANULOCYTES NFR BLD AUTO: 0.3 % (ref 0–0.9)
IMM GRANULOCYTES NFR BLD AUTO: 0.3 % (ref 0–0.9)
INT CON NEG: NORMAL
INT CON POS: NORMAL
KETONES UR STRIP.AUTO-MCNC: 40 MG/DL
LEUKOCYTE ESTERASE UR QL STRIP.AUTO: NEGATIVE
LIPASE SERPL-CCNC: 23 U/L (ref 11–82)
LIPASE SERPL-CCNC: 40 U/L (ref 11–82)
LYMPHOCYTES # BLD AUTO: 1.52 K/UL (ref 1–4.8)
LYMPHOCYTES # BLD AUTO: 1.6 K/UL (ref 1–4.8)
LYMPHOCYTES NFR BLD: 12.5 % (ref 22–41)
LYMPHOCYTES NFR BLD: 14.5 % (ref 22–41)
MCH RBC QN AUTO: 30.9 PG (ref 27–33)
MCH RBC QN AUTO: 31.2 PG (ref 27–33)
MCHC RBC AUTO-ENTMCNC: 34.7 G/DL (ref 33.7–35.3)
MCHC RBC AUTO-ENTMCNC: 35 G/DL (ref 33.7–35.3)
MCV RBC AUTO: 88.2 FL (ref 81.4–97.8)
MCV RBC AUTO: 89.8 FL (ref 81.4–97.8)
MONOCYTES # BLD AUTO: 1.45 K/UL (ref 0–0.85)
MONOCYTES # BLD AUTO: 1.66 K/UL (ref 0–0.85)
MONOCYTES NFR BLD AUTO: 13.1 % (ref 0–13.4)
MONOCYTES NFR BLD AUTO: 13.6 % (ref 0–13.4)
NEUTROPHILS # BLD AUTO: 7.89 K/UL (ref 1.82–7.42)
NEUTROPHILS # BLD AUTO: 8.87 K/UL (ref 1.82–7.42)
NEUTROPHILS NFR BLD: 71.3 % (ref 44–72)
NEUTROPHILS NFR BLD: 72.9 % (ref 44–72)
NITRITE UR QL STRIP.AUTO: NEGATIVE
NRBC # BLD AUTO: 0 K/UL
NRBC # BLD AUTO: 0 K/UL
NRBC BLD-RTO: 0 /100 WBC
NRBC BLD-RTO: 0 /100 WBC
PH UR STRIP.AUTO: 6 [PH] (ref 5–8)
PLATELET # BLD AUTO: 201 K/UL (ref 164–446)
PLATELET # BLD AUTO: 202 K/UL (ref 164–446)
PMV BLD AUTO: 10.4 FL (ref 9–12.9)
PMV BLD AUTO: 10.7 FL (ref 9–12.9)
POTASSIUM SERPL-SCNC: 3.5 MMOL/L (ref 3.6–5.5)
POTASSIUM SERPL-SCNC: 4 MMOL/L (ref 3.6–5.5)
PROT SERPL-MCNC: 7.6 G/DL (ref 6–8.2)
PROT SERPL-MCNC: 8.6 G/DL (ref 6–8.2)
PROT UR QL STRIP: 300 MG/DL
RBC # BLD AUTO: 5.1 M/UL (ref 4.7–6.1)
RBC # BLD AUTO: 5.18 M/UL (ref 4.7–6.1)
RBC UR QL AUTO: NORMAL
SODIUM SERPL-SCNC: 136 MMOL/L (ref 135–145)
SODIUM SERPL-SCNC: 137 MMOL/L (ref 135–145)
SP GR UR STRIP.AUTO: 1.03
UROBILINOGEN UR STRIP-MCNC: 2 MG/DL
WBC # BLD AUTO: 11.1 K/UL (ref 4.8–10.8)
WBC # BLD AUTO: 12.2 K/UL (ref 4.8–10.8)

## 2022-03-31 PROCEDURE — 83690 ASSAY OF LIPASE: CPT

## 2022-03-31 PROCEDURE — 87804 INFLUENZA ASSAY W/OPTIC: CPT | Performed by: FAMILY MEDICINE

## 2022-03-31 PROCEDURE — 36415 COLL VENOUS BLD VENIPUNCTURE: CPT

## 2022-03-31 PROCEDURE — 99214 OFFICE O/P EST MOD 30 MIN: CPT | Performed by: FAMILY MEDICINE

## 2022-03-31 PROCEDURE — 87086 URINE CULTURE/COLONY COUNT: CPT

## 2022-03-31 PROCEDURE — 80053 COMPREHEN METABOLIC PANEL: CPT

## 2022-03-31 PROCEDURE — 85025 COMPLETE CBC W/AUTO DIFF WBC: CPT | Mod: 91

## 2022-03-31 PROCEDURE — 83690 ASSAY OF LIPASE: CPT | Mod: 91

## 2022-03-31 PROCEDURE — 85025 COMPLETE CBC W/AUTO DIFF WBC: CPT

## 2022-03-31 PROCEDURE — 99284 EMERGENCY DEPT VISIT MOD MDM: CPT

## 2022-03-31 PROCEDURE — 80053 COMPREHEN METABOLIC PANEL: CPT | Mod: 91

## 2022-03-31 PROCEDURE — 81002 URINALYSIS NONAUTO W/O SCOPE: CPT | Performed by: FAMILY MEDICINE

## 2022-03-31 RX ORDER — FAMOTIDINE 20 MG/1
20 TABLET, FILM COATED ORAL 2 TIMES DAILY
Qty: 60 TABLET | Refills: 0 | Status: SHIPPED | OUTPATIENT
Start: 2022-03-31 | End: 2023-08-29

## 2022-03-31 RX ORDER — ONDANSETRON 4 MG/1
4 TABLET, ORALLY DISINTEGRATING ORAL EVERY 6 HOURS PRN
Qty: 10 TABLET | Refills: 0 | Status: SHIPPED | OUTPATIENT
Start: 2022-03-31 | End: 2023-08-29

## 2022-03-31 ASSESSMENT — ENCOUNTER SYMPTOMS
DIARRHEA: 1
FEVER: 0
VOMITING: 0

## 2022-03-31 ASSESSMENT — FIBROSIS 4 INDEX: FIB4 SCORE: 0.59

## 2022-03-31 NOTE — LETTER
March 31, 2022    To Whom It May Concern:         This is confirmation that Antonioleo Briones attended his scheduled appointment with Antonio Ray M.D. on 3/31/22.  He is recovering from an acute illness.  Please excuse him from work starting 3/29/22.  He will not be able to return to work tomorrow.  He is anticipated to be well enough to return on 4/2/22.           If you have any questions please do not hesitate to call me at the phone number listed below.    Sincerely,          Antonio Ray M.D.  [phone]

## 2022-03-31 NOTE — PROGRESS NOTES
"Subjective:     Antonio Briones is a 21 y.o. male who presents for Dehydration (Not able to eat much, lack of appetite, stomach aches, stools are liquid like/ (black/gray in color), shaky and dizzy, minor headaches. Onset Tuesday.. Tx: Pepto, Aleve, IBU. )    HPI  Pt presents for evaluation of an acute problem  Patient with stomach problems for the last 2 days  Having decreased appetite, stomachaches, and diarrhea  Not having abd pain, just upset   Having diarrhea   Having black stools which started after taking pepto bismol   Has had a little nausea, no vomiting   No sore throat, congestion, cough   Having 4-5 BM per day     Having some dysuria   Having some urinary hesitancy and incomplete voiding the past week  Urinary symptoms are intermittent     Review of Systems   Constitutional: Positive for malaise/fatigue. Negative for fever.   Gastrointestinal: Positive for diarrhea. Negative for vomiting.   Skin: Negative for rash.       PMH:  has no past medical history of ASTHMA or Diabetes.  MEDS: No current outpatient medications on file.  ALLERGIES: No Known Allergies  SURGHX: History reviewed. No pertinent surgical history.  SOCHX:  reports that he has been smoking cigarettes. He has a 0.30 pack-year smoking history. He has never used smokeless tobacco. He reports current alcohol use. He reports current drug use. Frequency: 7.00 times per week. Drug: Marijuana.     Objective:   /80 (BP Location: Left arm, Patient Position: Sitting, BP Cuff Size: Adult)   Pulse (!) 102   Temp 36.2 °C (97.1 °F) (Temporal)   Resp 16   Ht 1.727 m (5' 8\")   Wt 87.5 kg (193 lb)   SpO2 98%   BMI 29.35 kg/m²     Physical Exam  Constitutional:       General: He is not in acute distress.     Appearance: He is well-developed. He is not diaphoretic.   HENT:      Head: Normocephalic and atraumatic.      Right Ear: Tympanic membrane, ear canal and external ear normal.      Left Ear: Tympanic membrane, ear canal and external " ear normal.      Nose: Nose normal.      Mouth/Throat:      Mouth: Mucous membranes are moist.      Pharynx: Oropharynx is clear. No oropharyngeal exudate or posterior oropharyngeal erythema.   Pulmonary:      Effort: Pulmonary effort is normal.   Abdominal:      General: Abdomen is flat. Bowel sounds are normal. There is no distension.      Palpations: Abdomen is soft.      Tenderness: There is no abdominal tenderness. There is no right CVA tenderness, left CVA tenderness, guarding or rebound.   Musculoskeletal:      Cervical back: Normal range of motion and neck supple. No tenderness.   Lymphadenopathy:      Cervical: No cervical adenopathy.   Neurological:      Mental Status: He is alert.         Assessment/Plan:   Assessment    1. Flu-like symptoms  - POCT Influenza A/B  - POCT Urinalysis  - CBC WITH DIFFERENTIAL; Future  - Comp Metabolic Panel; Future  - LIPASE; Future  - URINE CULTURE(NEW); Future  - CRP QUANTITIVE (NON-CARDIAC); Future    2. Proteinuria, unspecified type  - Comp Metabolic Panel; Future  - CRP QUANTITIVE (NON-CARDIAC); Future    3. Diarrhea, unspecified type  - CBC WITH DIFFERENTIAL; Future  - Comp Metabolic Panel; Future  - LIPASE; Future  - URINE CULTURE(NEW); Future  - CRP QUANTITIVE (NON-CARDIAC); Future    Patient with diarrhea and general flulike illness.  Point-of-care influenza negative.  Has moderate blood, moderate bilirubin, and 300 protein on his urinalysis.  Negative leukocyte esterase and negative nitrate.  Unsure significance of urine and advised that it does not appear to be consistent with urinary tract infection.  Will send urine for culture to rule out.  He has no abdominal tenderness and do not see indication for abdominal CT at this time and recommended starting with lab work.  Will obtain labs to evaluate for pancreatitis, HUS, inflammatory bowel disease, and kidney function.  Did discuss possibility of being seen in ER versus continued outpatient work-up and patient  wanting to pursue outpatient work-up if possible.  We will follow-up test results.

## 2022-04-01 DIAGNOSIS — R19.7 DIARRHEA, UNSPECIFIED TYPE: ICD-10-CM

## 2022-04-01 DIAGNOSIS — R68.89 FLU-LIKE SYMPTOMS: ICD-10-CM

## 2022-04-01 NOTE — ED NOTES
Patient discharged home per ERP.  Discharge teaching and education discussed with patient. POC discussed.   Patient verbalized understanding of discharge teaching and education. No other questions at this time.     RX x 2 sent to pharmacy by ERP.    VSS. Patient alert and oriented. Patient arranged ride for self. Able to ambulate off unit safely with steady gait.

## 2022-04-01 NOTE — ED TRIAGE NOTES
"Antonio Briones  21 y.o. male  Chief Complaint   Patient presents with   • Sent from Urgent Care     By Dr. Ray for high WBC count   • Diarrhea     Loose stool since Tuesday   • Abdominal Cramping     Onset Tuesday        Pt ambulatory to triage with steady gait for above complaint. Patient does not know what his WBC count was. Per patient \"I've been feeling dehydrated since Tuesday. I've had diarrhea since then but today my diarrhea was really dark colored like michael.\"    Pt is alert and oriented, speaking in full sentences, follows commands and responds appropriately to questions. Resp are even and unlabored.     Protocol ordered. Patient placed in cardona for phlebotomy. Urine cup provided. Pt educated on triage process. Pt encouraged to alert staff for any changes. This RN masked and in appropriate PPE during encounter.     Vitals:    03/31/22 1839   BP: 134/83   Pulse: (!) 101   Resp: 16   Temp: 36.1 °C (97 °F)   SpO2: 96%     "

## 2022-04-01 NOTE — DISCHARGE INSTRUCTIONS
Your white blood cell count has come down from previous, I do not suspect significant infection such as appendicitis or gallbladder problems.    You are having diarrhea.  There is no cure for this use Imodium over-the-counter and if this does not work there is no other medications to assist with this.    Drink additional 3 to 5 glasses of water per day to increase hydration.  Take nausea medications if needed.  Otherwise Protonix to be given to reduce acid production to reduce intermittent epigastric pain.

## 2022-04-01 NOTE — ED NOTES
Discharge instructions given to pt. Prescriptions sent to preferred pharmacy. Pt educated, verbalizes understanding. All belongings accounted for. Pt ambulated out of ED with steady gait.

## 2022-04-01 NOTE — TELEPHONE ENCOUNTER
Called to discuss lab results.  Labs overall within normal limits with exception of white blood cell 12,000.  Patient has not been taking any more Pepto-Bismol and has not had any today.  Continues to have black stools.  Had a long discussion about risks and benefits of continued outpatient work-up versus ER work-up.  Recommended ER work-up as the continued black stools are very concerning.  Patient agreeable and will plan to be seen at the [facility] main ER.

## 2022-04-01 NOTE — ED PROVIDER NOTES
ED Provider  Scribed for Dru Landaverde D.O. by Gerry Harrison. 3/31/2022  7:29 PM    Means of arrival: Walk In  History obtained from: Patient  History limited by: None    CHIEF COMPLAINT  Chief Complaint   Patient presents with    Sent from Urgent Care     By Dr. Ray for high WBC count    Diarrhea     Loose stool since Tuesday    Abdominal Cramping     Onset Tuesday        HPI  Antonio Briones is a 21 y.o. male who presents to the ED by recommendation of Urgent Care for evaluation of intermittent stomach pain onset 2 days ago. He admits to associated symptoms of diarrhea onset 2 days ago with 3-4 episodes just today, dehydration, and subjective fever onset 2 days ago (subsided), but denies vomiting. No alleviating factors were reported. He denies being out of the country recently, anyone else sick at home, or being on antibiotics recently.    REVIEW OF SYSTEMS  See HPI for further details.    PAST MEDICAL HISTORY   None reported    SOCIAL HISTORY  Social History     Tobacco Use    Smoking status: Current Every Day Smoker     Packs/day: 0.20     Years: 2.00     Pack years: 0.40     Types: Cigarettes    Smokeless tobacco: Never Used   Vaping Use    Vaping Use: Never used   Substance and Sexual Activity    Alcohol use: Yes     Alcohol/week: 10.8 oz     Types: 14 Glasses of wine, 4 Shots of liquor per week    Drug use: Yes     Frequency: 7.0 times per week     Types: Marijuana, Inhaled     Comment: marijuana, cocaine occ    Sexual activity: Yes     Partners: Male     Birth control/protection: None       SURGICAL HISTORY  patient denies any surgical history    CURRENT MEDICATIONS  Home Medications       Reviewed by Lele Wiggins R.N. (Registered Nurse) on 03/31/22 at 1854  Med List Status: Partial     Medication Last Dose Status        Patient Lemuel Taking any Medications                           ALLERGIES  No Known Allergies    PHYSICAL EXAM  VITAL SIGNS: /95   Pulse (!) 102   Temp 36.1  "°C (97 °F) (Temporal)   Resp 18   Ht 1.676 m (5' 6\")   Wt 86.7 kg (191 lb 2.2 oz)   SpO2 98%   BMI 30.85 kg/m²   Constitutional: Alert in no apparent distress.  HENT: No signs of trauma, mucous membranes are moist  Eyes: Conjunctiva normal, Non-icteric.   Neck: Normal range of motion, No tenderness, Supple.  Lymphatic: No lymphadenopathy noted.   Cardiovascular: Regular rate and rhythm, no murmurs.   Thorax & Lungs: Normal breath sounds, No respiratory distress, No wheezing, No chest tenderness.   Abdomen: Bowel sounds normal, Soft, No tenderness, No masses, No pulsatile masses. No peritoneal signs.  Skin: Warm, Dry, normal color.   Back: No bony tenderness, No CVA tenderness.   Extremities: No edema, No tenderness, No cyanosis  Musculoskeletal: Good range of motion in all major joints. No tenderness to palpation or major deformities noted.   Neurologic: Alert and oriented x4, Normal motor function, Normal sensory function, No focal deficits noted.   Psychiatric: Affect normal, Judgment normal, Mood normal.     LABS  Results for orders placed or performed during the hospital encounter of 03/31/22   CBC WITH DIFFERENTIAL   Result Value Ref Range    WBC 11.1 (H) 4.8 - 10.8 K/uL    RBC 5.18 4.70 - 6.10 M/uL    Hemoglobin 16.0 14.0 - 18.0 g/dL    Hematocrit 45.7 42.0 - 52.0 %    MCV 88.2 81.4 - 97.8 fL    MCH 30.9 27.0 - 33.0 pg    MCHC 35.0 33.7 - 35.3 g/dL    RDW 40.5 35.9 - 50.0 fL    Platelet Count 202 164 - 446 K/uL    MPV 10.4 9.0 - 12.9 fL    Neutrophils-Polys 71.30 44.00 - 72.00 %    Lymphocytes 14.50 (L) 22.00 - 41.00 %    Monocytes 13.10 0.00 - 13.40 %    Eosinophils 0.30 0.00 - 6.90 %    Basophils 0.50 0.00 - 1.80 %    Immature Granulocytes 0.30 0.00 - 0.90 %    Nucleated RBC 0.00 /100 WBC    Neutrophils (Absolute) 7.89 (H) 1.82 - 7.42 K/uL    Lymphs (Absolute) 1.60 1.00 - 4.80 K/uL    Monos (Absolute) 1.45 (H) 0.00 - 0.85 K/uL    Eos (Absolute) 0.03 0.00 - 0.51 K/uL    Baso (Absolute) 0.05 0.00 - 0.12 " K/uL    Immature Granulocytes (abs) 0.03 0.00 - 0.11 K/uL    NRBC (Absolute) 0.00 K/uL   COMP METABOLIC PANEL   Result Value Ref Range    Sodium 137 135 - 145 mmol/L    Potassium 3.5 (L) 3.6 - 5.5 mmol/L    Chloride 96 96 - 112 mmol/L    Co2 27 20 - 33 mmol/L    Anion Gap 14.0 7.0 - 16.0    Glucose 101 (H) 65 - 99 mg/dL    Bun 11 8 - 22 mg/dL    Creatinine 0.86 0.50 - 1.40 mg/dL    Calcium 9.7 8.5 - 10.5 mg/dL    AST(SGOT) 23 12 - 45 U/L    ALT(SGPT) 24 2 - 50 U/L    Alkaline Phosphatase 66 30 - 99 U/L    Total Bilirubin 0.6 0.1 - 1.5 mg/dL    Albumin 4.7 3.2 - 4.9 g/dL    Total Protein 8.6 (H) 6.0 - 8.2 g/dL    Globulin 3.9 (H) 1.9 - 3.5 g/dL    A-G Ratio 1.2 g/dL   LIPASE   Result Value Ref Range    Lipase 40 11 - 82 U/L   ESTIMATED GFR   Result Value Ref Range    GFR (CKD-EPI) 125 >60 mL/min/1.73 m 2     All labs reviewed by me.    COURSE  Pertinent Labs & Imaging studies reviewed. (See chart for details)    7:29 PM - Patient seen and examined at bedside. Discussed plan of care. Ordered for CBC w/ diff, CMP, Lipase, and UA to evaluate his symptoms.     7:41 PM - I reevaluated the patient at bedside. I discussed the patient's diagnostic study results. I discussed plan for discharge and follow up as outlined below. The patient is stable for discharge at this time and will return for any new or worsening symptoms. Patient verbalizes understanding and support with my plan for discharge.     MEDICAL DECISION MAKING  This is a 21 y.o. male who presents with diarrhea, and intermittent aide pain.  He has had nausea with no vomiting.  He has been able to eat and drink without difficulty.  White blood cell count is 12,000    On my evaluation he has no abdominal pain his abdomen is soft and nontender.  And he has had 3 episodes of diarrhea today.  His elevated white blood cell count is probably related to diarrhea his abdominal exam is not consistent with appendicitis or cholecystitis or other surgical or colitis type  disease.    He is able to tolerate liquids he is stable for discharge home with a symptomatic care.      The patient will return for new or worsening symptoms and is stable at the time of discharge.    DISPOSITION:  Patient will be discharged home in stable condition.    FOLLOW UP:  [facility] scheduling  Please call [phone] to make an appoint with a next available practitioner for follow-up  In 3 days    OUTPATIENT MEDICATIONS:  Discharge Medication List as of 3/31/2022  7:47 PM        START taking these medications    Details   ondansetron (ZOFRAN ODT) 4 MG TABLET DISPERSIBLE Take 1 Tablet by mouth every 6 hours as needed for Nausea., Disp-10 Tablet, R-0, Normal      famotidine (PEPCID) 20 MG Tab Take 1 Tablet by mouth 2 times a day., Disp-60 Tablet, R-0, Normal             FINAL IMPRESSION  1. Diarrhea, unspecified type    2. Intermittent epigastric abdominal pain         Gerry VO ([place]), am scribing for, and in the presence of, Dru Landaverde D.O..    Electronically signed by: Gerry Harrison ([place]), 3/31/2022    IDru D.O. personally performed the services described in this documentation, as scribed by Gerry Harrison in my presence, and it is both accurate and complete.    The note accurately reflects work and decisions made by me.  Dru Landaverde D.O.  3/31/2022  9:53 PM

## 2022-04-03 LAB
BACTERIA UR CULT: NORMAL
SIGNIFICANT IND 70042: NORMAL
SITE SITE: NORMAL
SOURCE SOURCE: NORMAL

## 2023-01-18 ENCOUNTER — OFFICE VISIT (OUTPATIENT)
Dept: URGENT CARE | Facility: CLINIC | Age: 23
End: 2023-01-18
Payer: COMMERCIAL

## 2023-01-18 VITALS
TEMPERATURE: 98.1 F | HEART RATE: 89 BPM | SYSTOLIC BLOOD PRESSURE: 122 MMHG | RESPIRATION RATE: 18 BRPM | WEIGHT: 187 LBS | DIASTOLIC BLOOD PRESSURE: 86 MMHG | HEIGHT: 69 IN | BODY MASS INDEX: 27.7 KG/M2 | OXYGEN SATURATION: 96 %

## 2023-01-18 DIAGNOSIS — J02.0 PHARYNGITIS DUE TO STREPTOCOCCUS SPECIES: ICD-10-CM

## 2023-01-18 LAB
INT CON NEG: NORMAL
INT CON POS: NORMAL
S PYO AG THROAT QL: POSITIVE

## 2023-01-18 PROCEDURE — 99213 OFFICE O/P EST LOW 20 MIN: CPT | Performed by: NURSE PRACTITIONER

## 2023-01-18 PROCEDURE — 87880 STREP A ASSAY W/OPTIC: CPT | Performed by: NURSE PRACTITIONER

## 2023-01-18 RX ORDER — AMOXICILLIN 500 MG/1
500 CAPSULE ORAL 2 TIMES DAILY
Qty: 20 CAPSULE | Refills: 0 | Status: SHIPPED | OUTPATIENT
Start: 2023-01-18 | End: 2023-01-18

## 2023-01-18 RX ORDER — ONDANSETRON 4 MG/1
4 TABLET, ORALLY DISINTEGRATING ORAL EVERY 6 HOURS PRN
Qty: 15 TABLET | Refills: 0 | Status: SHIPPED | OUTPATIENT
Start: 2023-01-18 | End: 2023-01-18

## 2023-01-18 RX ORDER — AMOXICILLIN 500 MG/1
500 CAPSULE ORAL 2 TIMES DAILY
Qty: 20 CAPSULE | Refills: 0 | Status: SHIPPED | OUTPATIENT
Start: 2023-01-18 | End: 2023-01-28

## 2023-01-18 RX ORDER — ONDANSETRON 4 MG/1
4 TABLET, ORALLY DISINTEGRATING ORAL EVERY 6 HOURS PRN
Qty: 15 TABLET | Refills: 0 | Status: SHIPPED
Start: 2023-01-18 | End: 2023-08-29

## 2023-01-18 ASSESSMENT — FIBROSIS 4 INDEX: FIB4 SCORE: 0.53

## 2023-01-18 NOTE — PROGRESS NOTES
Patient has consented to treatment and for use of patient information for treatment and billing purposes.    Date: 01/18/23     Arrival Mode: Private Vehicle / Ambulatory    Chief Complaint:    Chief Complaint   Patient presents with    Pharyngitis     X2day, Fever, sore throat, concern for strep,         History of Present Illness: 22 y.o. male  presents to clinic with 2-day history fever, body aches and sore throat.  Patient admits to mild nasal congestion and cough.  Denies any ear pain.  Admits to nausea no vomiting or diarrhea.  No abdominal pain.  Denies any shortness of breath chest pain  .  Sick contacts include his boyfriend who has a sore throat as well.  Patient presents to clinic with concerns for strep pharyngitis.      ROS:  As stated in HPI     Pertinent Medical History:    History reviewed. No pertinent past medical history.     Pertinent Surgical History:    History reviewed. No pertinent surgical history.     Current  Medications:  Current Outpatient Medications on File Prior to Visit   Medication Sig Dispense Refill    ondansetron (ZOFRAN ODT) 4 MG TABLET DISPERSIBLE Take 1 Tablet by mouth every 6 hours as needed for Nausea. (Patient not taking: Reported on 1/18/2023) 10 Tablet 0    famotidine (PEPCID) 20 MG Tab Take 1 Tablet by mouth 2 times a day. (Patient not taking: Reported on 1/18/2023) 60 Tablet 0     No current facility-administered medications on file prior to visit.        Allergies:     Patient has no known allergies.     Social History:   Social History     Socioeconomic History    Marital status: Single     Spouse name: Not on file    Number of children: Not on file    Years of education: Not on file    Highest education level: Not on file   Occupational History    Not on file   Tobacco Use    Smoking status: Every Day     Packs/day: 0.20     Years: 2.00     Pack years: 0.40     Types: Cigarettes    Smokeless tobacco: Never   Vaping Use    Vaping Use: Never used   Substance and Sexual  Activity    Alcohol use: Yes     Alcohol/week: 10.8 oz     Types: 14 Glasses of wine, 4 Shots of liquor per week    Drug use: Not Currently     Frequency: 7.0 times per week     Types: Marijuana, Inhaled     Comment: marijuana, cocaine occ    Sexual activity: Yes     Partners: Male     Birth control/protection: None   Other Topics Concern    Not on file   Social History Narrative    ** Merged History Encounter **          Social Determinants of Health     Financial Resource Strain: Not on file   Food Insecurity: Not on file   Transportation Needs: Not on file   Physical Activity: Not on file   Stress: Not on file   Social Connections: Not on file   Intimate Partner Violence: Not on file   Housing Stability: Not on file        No LMP for male patient.       Physical Exam:  Vitals:    01/18/23 0928   BP: 122/86   Pulse: 89   Resp: 18   Temp: 36.7 °C (98.1 °F)   SpO2: 96%        Physical Exam  Constitutional:       General: He is awake.      Appearance: Normal appearance. He is not ill-appearing, toxic-appearing or diaphoretic.   HENT:      Head: Normocephalic and atraumatic.      Right Ear: Tympanic membrane, ear canal and external ear normal.      Left Ear: Tympanic membrane, ear canal and external ear normal.      Nose: Rhinorrhea present. Rhinorrhea is clear.      Mouth/Throat:      Lips: Pink.      Mouth: Mucous membranes are moist.      Tongue: No lesions.      Palate: No lesions.      Pharynx: Posterior oropharyngeal erythema present. No oropharyngeal exudate or uvula swelling.      Tonsils: Tonsillar exudate present. No tonsillar abscesses. 3+ on the right. 2+ on the left.   Eyes:      General: Lids are normal. Gaze aligned appropriately. No allergic shiner or scleral icterus.     Extraocular Movements: Extraocular movements intact.      Conjunctiva/sclera: Conjunctivae normal.      Pupils: Pupils are equal, round, and reactive to light.   Cardiovascular:      Rate and Rhythm: Normal rate and regular rhythm.       Pulses:           Radial pulses are 2+ on the right side and 2+ on the left side.      Heart sounds: Normal heart sounds.   Pulmonary:      Effort: Pulmonary effort is normal.      Breath sounds: Normal breath sounds and air entry. No decreased breath sounds, wheezing, rhonchi or rales.   Abdominal:      General: Abdomen is flat. Bowel sounds are normal.      Palpations: Abdomen is soft.      Tenderness: There is no abdominal tenderness.   Musculoskeletal:      Right lower leg: No edema.      Left lower leg: No edema.   Lymphadenopathy:      Cervical: Cervical adenopathy (Small tender movable) present.   Skin:     General: Skin is warm.      Capillary Refill: Capillary refill takes less than 2 seconds.      Coloration: Skin is not cyanotic or pale.   Neurological:      Mental Status: He is alert and oriented to person, place, and time.      Gait: Gait is intact.   Psychiatric:         Behavior: Behavior normal. Behavior is cooperative.        Diagnostics:        Recent Results (from the past 24 hour(s))   POCT Rapid Strep A    Collection Time: 01/18/23  9:29 AM   Result Value Ref Range    Rapid Strep Screen POSITIVE     Internal Control Positive Valid     Internal Control Negative Valid        .    Medical Decision Making:  I personally reviewed prior external notes and test results pertinent to today's visit.   Shared decision-making was utilized with patient did develop treatment plan and clinic course. Tanvir, 22 y.o. male presenting clinic on day 2 of sore throat and fever.  Did obtain a POCT strep of which the results were positive.  Will send for amoxicillin 10-day course advised to finish antibiotics in their entirety and change toothbrush on day 3 of antibiotics.  We will send Zofran for nausea as well.  We will provide a work note as requested    Did advise patient on conservative measures for management of symptoms.  Patient is agreeable to pursue adequate rest, adequate hydration, saltwater gargle and  Neti pot for any symptoms of upper respiratory congestion.  Over-the-counter analgesia and antipyretics on a p.r.n. basis as needed for pain and fever.  Did discuss over-the-counter cough medications.      Patient will monitor symptoms closely for worsening and is advised to seek further evaluation the emergency room if alarm signs or symptoms arise.  Patient states understanding and verbalizes agreement with this plan of care.    Plan:    1. Pharyngitis due to Streptococcus species    - POCT Rapid Strep A  - amoxicillin (AMOXIL) 500 MG Cap; Take 1 Capsule by mouth 2 times a day for 10 days.  Dispense: 20 Capsule; Refill: 0  - ondansetron (ZOFRAN ODT) 4 MG TABLET DISPERSIBLE; Take 1 Tablet by mouth every 6 hours as needed for Nausea/Vomiting for up to 15 doses.  Dispense: 15 Tablet; Refill: 0          Disposition:  Patient was discharged in stable condition.    Voice Recognition Disclaimer: Portions of this document were created using voice recognition software. The software does have a chance of producing errors of grammar and possibly content. I have made every reasonable attempt to correct obvious errors, but there may be errors of grammar and possibly content that I did not discover before finalizing the documentation.    Kirstie Alexander, A.P.R.N.

## 2023-01-18 NOTE — LETTER
January 18, 2023    To Whom It May Concern:         This is confirmation that Antonio Dumont Briones attended his scheduled appointment with KRYSTAL Lam on 1/18/23. Please excuse from work on 1/18/23 due to illness. May return 1/19/23 with no restrictions.          Sincerely,          Kirstie Alexander A.P.R.N.  [phone]

## 2023-03-12 ENCOUNTER — OFFICE VISIT (OUTPATIENT)
Dept: URGENT CARE | Facility: CLINIC | Age: 23
End: 2023-03-12
Payer: COMMERCIAL

## 2023-03-12 VITALS
OXYGEN SATURATION: 97 % | DIASTOLIC BLOOD PRESSURE: 74 MMHG | RESPIRATION RATE: 16 BRPM | BODY MASS INDEX: 26.66 KG/M2 | HEART RATE: 103 BPM | WEIGHT: 180 LBS | SYSTOLIC BLOOD PRESSURE: 126 MMHG | HEIGHT: 69 IN | TEMPERATURE: 98.6 F

## 2023-03-12 DIAGNOSIS — T14.8XXA MUSCLE STRAIN: ICD-10-CM

## 2023-03-12 PROCEDURE — 99213 OFFICE O/P EST LOW 20 MIN: CPT

## 2023-03-12 ASSESSMENT — FIBROSIS 4 INDEX: FIB4 SCORE: 0.53

## 2023-03-12 NOTE — LETTER
March 12, 2023    To Whom It May Concern:         This is confirmation that Antonio Briones attended his scheduled appointment with KRYSTAL Brown on 3/12/23. Please excuse him from work 3/10/23. He may return to work 3/13/23/         If you have any questions please do not hesitate to call me at the phone number listed below.    Sincerely,          BRIAN Brown.  [phone]

## 2023-04-12 ENCOUNTER — OFFICE VISIT (OUTPATIENT)
Dept: URGENT CARE | Facility: CLINIC | Age: 23
End: 2023-04-12
Payer: COMMERCIAL

## 2023-04-12 VITALS
TEMPERATURE: 99.6 F | BODY MASS INDEX: 27.7 KG/M2 | DIASTOLIC BLOOD PRESSURE: 70 MMHG | HEART RATE: 78 BPM | SYSTOLIC BLOOD PRESSURE: 116 MMHG | WEIGHT: 187 LBS | OXYGEN SATURATION: 97 % | RESPIRATION RATE: 18 BRPM | HEIGHT: 69 IN

## 2023-04-12 DIAGNOSIS — K64.9 HEMORRHOIDS, UNSPECIFIED HEMORRHOID TYPE: ICD-10-CM

## 2023-04-12 PROCEDURE — 99213 OFFICE O/P EST LOW 20 MIN: CPT | Performed by: NURSE PRACTITIONER

## 2023-04-12 RX ORDER — HYDROCORTISONE ACETATE 25 MG/1
25 SUPPOSITORY RECTAL EVERY 12 HOURS
Qty: 12 SUPPOSITORY | Refills: 0 | Status: SHIPPED
Start: 2023-04-12 | End: 2023-08-29

## 2023-04-12 ASSESSMENT — VISUAL ACUITY: OU: 1

## 2023-04-12 ASSESSMENT — FIBROSIS 4 INDEX: FIB4 SCORE: 0.53

## 2023-04-12 ASSESSMENT — ENCOUNTER SYMPTOMS
CONSTIPATION: 1
CONSTITUTIONAL NEGATIVE: 1

## 2023-04-12 NOTE — LETTER
April 12, 2023         Patient: Antonio Briones   YOB: 2000   Date of Visit: 4/12/2023           To Whom it May Concern:    Antonio Briones was seen in my clinic on 4/12/2023 due to illness. Due to medical necessity, please excuse patient from work 4/12/2023.     If you have any questions or concerns, please don't hesitate to call.        Sincerely,           BRIAN Carvalho.  Electronically Signed

## 2023-04-12 NOTE — PROGRESS NOTES
Subjective:     Antonio Briones is a 22 y.o. male who presents for Hemorrhoids (X 2 days, hemorrhoids, bleeding, painful.)       Hemorrhoids  This is a new problem. The problem has been gradually worsening. He has tried nothing for the symptoms.     Patient reporting bulging outside the anus, tenderness, and BRBPR with wiping.    History of hemorrhoids in the past. Feels similar.    Has been constipated.    Review of Systems   Constitutional: Negative.    Gastrointestinal:  Positive for constipation and hemorrhoids.   All other systems reviewed and are negative.    Refer to HPI for additional details.    During this visit, appropriate PPE was worn, hand hygiene was performed, and the patient and any visitors were masked.    PMH:  has no past medical history of ASTHMA or Diabetes.    MEDS:   Current Outpatient Medications:     hydrocortisone (ANUSOL-HC) 25 MG Suppos, Insert 1 Suppository into the rectum every 12 hours., Disp: 12 Suppository, Rfl: 0    ondansetron (ZOFRAN ODT) 4 MG TABLET DISPERSIBLE, Take 1 Tablet by mouth every 6 hours as needed for Nausea/Vomiting for up to 15 doses. (Patient not taking: Reported on 3/12/2023), Disp: 15 Tablet, Rfl: 0    ondansetron (ZOFRAN ODT) 4 MG TABLET DISPERSIBLE, Take 1 Tablet by mouth every 6 hours as needed for Nausea. (Patient not taking: Reported on 1/18/2023), Disp: 10 Tablet, Rfl: 0    famotidine (PEPCID) 20 MG Tab, Take 1 Tablet by mouth 2 times a day. (Patient not taking: Reported on 1/18/2023), Disp: 60 Tablet, Rfl: 0    ALLERGIES: No Known Allergies  SURGHX: History reviewed. No pertinent surgical history.  SOCHX:  reports that he has been smoking cigarettes. He has a 0.40 pack-year smoking history. He has never used smokeless tobacco. He reports current alcohol use of about 10.8 oz per week. He reports that he does not currently use drugs after having used the following drugs: Marijuana and Inhaled. Frequency: 7.00 times per week.    FH: Per HPI as  "applicable/pertinent.      Objective:     /70   Pulse 78   Temp 37.6 °C (99.6 °F) (Temporal)   Resp 18   Ht 1.753 m (5' 9\")   Wt 84.8 kg (187 lb)   SpO2 97%   BMI 27.62 kg/m²     Physical Exam  Nursing note reviewed.   Constitutional:       General: He is not in acute distress.     Appearance: He is well-developed. He is not ill-appearing or toxic-appearing.   Eyes:      General: Vision grossly intact.   Cardiovascular:      Rate and Rhythm: Normal rate.   Pulmonary:      Effort: Pulmonary effort is normal. No respiratory distress.   Genitourinary:     Rectum: External hemorrhoid (TTP, not thrombosed) present.       Musculoskeletal:         General: No deformity. Normal range of motion.   Skin:     General: Skin is warm and dry.      Coloration: Skin is not pale.   Neurological:      Mental Status: He is alert and oriented to person, place, and time.      Motor: No weakness.   Psychiatric:         Behavior: Behavior normal. Behavior is cooperative.       Assessment/Plan:     1. Hemorrhoids, unspecified hemorrhoid type  - hydrocortisone (ANUSOL-HC) 25 MG Suppos; Insert 1 Suppository into the rectum every 12 hours.  Dispense: 12 Suppository; Refill: 0    Rx as above sent electronically. Sitz baths PRN. Advise OTC stool softener. Increase fluids, fiber.    Vital signs stable, afebrile, no acute distress at this time. Monitor. Warning signs reviewed. Return precautions discussed.     Differential diagnosis, natural history, supportive care, over-the-counter symptom management per 's instructions, close monitoring, and indications for immediate follow-up discussed.     All questions answered. Patient agrees with the plan of care.    Discharge summary provided.    Work note provided.  "

## 2023-08-29 ENCOUNTER — OFFICE VISIT (OUTPATIENT)
Dept: URGENT CARE | Facility: CLINIC | Age: 23
End: 2023-08-29
Payer: COMMERCIAL

## 2023-08-29 VITALS
OXYGEN SATURATION: 97 % | HEIGHT: 69 IN | BODY MASS INDEX: 25.36 KG/M2 | DIASTOLIC BLOOD PRESSURE: 88 MMHG | WEIGHT: 171.2 LBS | RESPIRATION RATE: 12 BRPM | TEMPERATURE: 99.1 F | HEART RATE: 98 BPM | SYSTOLIC BLOOD PRESSURE: 122 MMHG

## 2023-08-29 DIAGNOSIS — J03.90 ACUTE TONSILLITIS, UNSPECIFIED ETIOLOGY: Primary | ICD-10-CM

## 2023-08-29 DIAGNOSIS — J02.9 SORE THROAT: ICD-10-CM

## 2023-08-29 DIAGNOSIS — R59.0 CERVICAL LYMPHADENOPATHY: ICD-10-CM

## 2023-08-29 LAB — S PYO DNA SPEC NAA+PROBE: NOT DETECTED

## 2023-08-29 PROCEDURE — 87651 STREP A DNA AMP PROBE: CPT | Performed by: NURSE PRACTITIONER

## 2023-08-29 PROCEDURE — 3079F DIAST BP 80-89 MM HG: CPT | Performed by: NURSE PRACTITIONER

## 2023-08-29 PROCEDURE — 99214 OFFICE O/P EST MOD 30 MIN: CPT | Performed by: NURSE PRACTITIONER

## 2023-08-29 PROCEDURE — 3074F SYST BP LT 130 MM HG: CPT | Performed by: NURSE PRACTITIONER

## 2023-08-29 RX ORDER — AMOXICILLIN 500 MG/1
500 CAPSULE ORAL 2 TIMES DAILY
Qty: 20 CAPSULE | Refills: 0 | Status: SHIPPED | OUTPATIENT
Start: 2023-08-29 | End: 2023-09-08

## 2023-08-29 ASSESSMENT — FIBROSIS 4 INDEX: FIB4 SCORE: 0.53

## 2023-08-29 NOTE — LETTER
August 29, 2023       Patient: Antonio Briones   YOB: 2000   Date of Visit: 8/29/2023         To Whom It May Concern:    In my medical opinion, I recommend that Antonio Briones return to full duty, no restrictions on 08/30/23              Sincerely,          ALLA GiangPChaitanyaN.  Electronically Signed

## 2023-08-29 NOTE — PROGRESS NOTES
"Antonio Briones is a 22 y.o. male who presents for Pharyngitis (X2 Days, \" Tonsils are very swollen and painful.\")      HPI  This is a new problem. Antonio Briones is a 22 y.o. patient who presents to urgent care with c/o: sore throat x 2 days. Tonsils are swollen and painful. Neck is painful. Hurts to swallow or try to eat. No known exposure to persons with strep throat. Has felt feverish.  No other aggravating or alleviating factors.           ROS See HPI    Allergies:     No Known Allergies    PMSFS Hx:  No past medical history on file.  No past surgical history on file.  No family history on file.  Social History     Tobacco Use    Smoking status: Every Day     Current packs/day: 0.20     Average packs/day: 0.2 packs/day for 2.0 years (0.4 ttl pk-yrs)     Types: Cigarettes    Smokeless tobacco: Never   Substance Use Topics    Alcohol use: Yes     Alcohol/week: 10.8 oz     Types: 14 Glasses of wine, 4 Shots of liquor per week       Problems:   There is no problem list on file for this patient.      Medications:   No current outpatient medications on file prior to visit.     No current facility-administered medications on file prior to visit.          Objective:     /88 (BP Location: Right arm, Patient Position: Sitting, BP Cuff Size: Adult)   Pulse 98   Temp 37.3 °C (99.1 °F) (Temporal)   Resp 12   Ht 1.753 m (5' 9\")   Wt 77.7 kg (171 lb 3.2 oz)   SpO2 97%   BMI 25.28 kg/m²     Physical Exam  Vitals and nursing note reviewed.   Constitutional:       General: He is not in acute distress.     Appearance: Normal appearance. He is well-developed and well-groomed. He is not ill-appearing or toxic-appearing.   HENT:      Head: Normocephalic.      Right Ear: Hearing, tympanic membrane, ear canal and external ear normal.      Left Ear: Hearing, tympanic membrane, ear canal and external ear normal.      Nose: Nose normal.      Mouth/Throat:      Lips: Pink.      Mouth: Mucous membranes are moist. "      Pharynx: Oropharynx is clear. Uvula midline. Posterior oropharyngeal erythema present. No oropharyngeal exudate or uvula swelling.      Tonsils: No tonsillar abscesses.   Eyes:      General: Lids are normal.      Conjunctiva/sclera: Conjunctivae normal.      Pupils: Pupils are equal, round, and reactive to light.   Neck:      Trachea: Trachea and phonation normal.   Cardiovascular:      Rate and Rhythm: Normal rate and regular rhythm.      Pulses: Normal pulses.      Heart sounds: Normal heart sounds.   Pulmonary:      Effort: Pulmonary effort is normal. No respiratory distress.      Breath sounds: Normal breath sounds.   Abdominal:      Palpations: Abdomen is soft.   Musculoskeletal:         General: Normal range of motion.      Cervical back: Normal range of motion and neck supple.   Lymphadenopathy:      Head:      Right side of head: Tonsillar adenopathy present. No preauricular or posterior auricular adenopathy.      Left side of head: Tonsillar adenopathy present. No preauricular or posterior auricular adenopathy.      Cervical: No cervical adenopathy.   Skin:     General: Skin is warm and dry.      Capillary Refill: Capillary refill takes less than 2 seconds.   Neurological:      Mental Status: He is alert and oriented to person, place, and time.   Psychiatric:         Mood and Affect: Mood normal.         Speech: Speech normal.         Behavior: Behavior normal. Behavior is cooperative.         Thought Content: Thought content normal.         Judgment: Judgment normal.       Results for orders placed or performed in visit on 08/29/23   POCT GROUP A STREP, PCR   Result Value Ref Range    POC Group A Strep, PCR Not Detected Not Detected, Invalid     Centor Score:   Tonsillar Exudate: No   Tender Cervical Lymph Nodes: Yes  Fever: Yes  Cough present: No   Total Score = 3      Assessment /Associated Orders:      1. Acute tonsillitis, unspecified etiology  amoxicillin (AMOXIL) 500 MG Cap      2. Sore throat   POCT GROUP A STREP, PCR      3. Cervical lymphadenopathy            Medical Decision Making:      Pt is clinically stable at today's acute urgent care visit.  No acute distress noted.  VSS. Appropriate for outpatient care at this time.   Acute problem today with uncertain prognosis.     Rapid strep test was negative but clinical exam and center scores positive.  Treated for acute tonsillitis/bacterial.  This was discussed with patient.  Educated in proper administration of  prescription medication(s) ordered today including safety, possible SE, risks, benefits, rationale and alternatives to therapy.   Salt water gargles BID and prn. Suggested 1/4 to 1/2 teaspoon (1.5 to 3.0 g) of salt per one cup (8 ounces or 250 mL) of warm water.   OTC throat analgesic spray or lozenge of choice prn throat pain. Dosage and directions per   OTC  analgesic of choice (acetaminophen or NSAID) prn pain. Follow manufactures dosing and safety precautions.   Stay well hydrated.   Educated in infection control practices.   Discussed Dx, management options (risks,benefits, and alternatives to planned treatment), natural progression and supportive care.  Expressed understanding and the treatment plan was agreed upon.   Questions were encouraged and answered   Return to urgent care prn if new or worsening sx or if there is no improvement in condition prn.    Educated in Red flags and indications to immediately call 911 or present to the Emergency Department.       Time I spent evaluating Antonio Briones in urgent care today was 30  minutes. This time includes preparing for visit, reviewing any pertinent notes or test results, counseling/education, exam, obtaining HPI, interpretation of lab tests, medication management and documentation as indicated above.Time does not include separately billable procedures noted .       Please note that this dictation was created using voice recognition software. I have worked with  consultants from the vendor as well as technical experts from [facility] to optimize the interface. I have made every reasonable attempt to correct obvious errors, but I expect that there are errors of grammar and possibly content that I did not discover before finalizing the note.  This note was electronically signed by provider

## 2023-10-11 ENCOUNTER — OFFICE VISIT (OUTPATIENT)
Dept: URGENT CARE | Facility: CLINIC | Age: 23
End: 2023-10-11
Payer: COMMERCIAL

## 2023-10-11 VITALS
RESPIRATION RATE: 16 BRPM | TEMPERATURE: 99.1 F | DIASTOLIC BLOOD PRESSURE: 82 MMHG | WEIGHT: 181.2 LBS | BODY MASS INDEX: 26.84 KG/M2 | SYSTOLIC BLOOD PRESSURE: 136 MMHG | OXYGEN SATURATION: 96 % | HEART RATE: 102 BPM | HEIGHT: 69 IN

## 2023-10-11 DIAGNOSIS — J03.90 TONSILLITIS WITH EXUDATE: ICD-10-CM

## 2023-10-11 LAB — S PYO DNA SPEC NAA+PROBE: NOT DETECTED

## 2023-10-11 PROCEDURE — 3079F DIAST BP 80-89 MM HG: CPT | Performed by: REGISTERED NURSE

## 2023-10-11 PROCEDURE — 87651 STREP A DNA AMP PROBE: CPT | Performed by: REGISTERED NURSE

## 2023-10-11 PROCEDURE — 99214 OFFICE O/P EST MOD 30 MIN: CPT | Performed by: REGISTERED NURSE

## 2023-10-11 PROCEDURE — 3075F SYST BP GE 130 - 139MM HG: CPT | Performed by: REGISTERED NURSE

## 2023-10-11 RX ORDER — DEXAMETHASONE SODIUM PHOSPHATE 10 MG/ML
10 INJECTION INTRAMUSCULAR; INTRAVENOUS ONCE
Status: COMPLETED | OUTPATIENT
Start: 2023-10-11 | End: 2023-10-11

## 2023-10-11 RX ORDER — AMOXICILLIN 500 MG/1
500 CAPSULE ORAL 2 TIMES DAILY
Qty: 20 CAPSULE | Refills: 0 | Status: SHIPPED | OUTPATIENT
Start: 2023-10-11 | End: 2023-10-21

## 2023-10-11 RX ADMIN — DEXAMETHASONE SODIUM PHOSPHATE 10 MG: 10 INJECTION INTRAMUSCULAR; INTRAVENOUS at 09:11

## 2023-10-11 ASSESSMENT — ENCOUNTER SYMPTOMS
DIZZINESS: 0
HEADACHES: 0
HEMOPTYSIS: 0
FEVER: 0
CHILLS: 0
ABDOMINAL PAIN: 0
STRIDOR: 0
NECK PAIN: 0
SHORTNESS OF BREATH: 0

## 2023-10-11 ASSESSMENT — FIBROSIS 4 INDEX: FIB4 SCORE: 0.56

## 2023-10-11 NOTE — LETTER
October 11, 2023         Patient: Antonio Briones   YOB: 2000   Date of Visit: 10/11/2023           To Whom it May Concern:    Antonio Briones was seen in my clinic on 10/11/2023. He may return to work on 10/12/23.    If you have any questions or concerns, please don't hesitate to call.        Sincerely,           KRYSTAL Toribio  Electronically Signed

## 2023-10-11 NOTE — PROGRESS NOTES
"Subjective:   Antonio Briones is a 23 y.o. male who presents for Pharyngitis (Started yesterday, \" Left tonsil is very swollen. Pain is becoming progressively worse.\" )    HPI  Seen in clinic on 8/29/23 for tonsillitis, started on antibiotic, did not finish because he lost it only took about 4 days worth. Since then has had worsening throat pain, exacerbated with swallowing.  Has tried ibuprofen but none today.  Felt feverish without confirmed fever. Hx of strep throat. No hospitalizations.  No other pertinent medical history.  Currently tolerating p.o.    Denies difficulty opening mouth, muffled voice, drooling, decreased ROM neck    Review of Systems   Constitutional:  Negative for chills and fever.   HENT:  Negative for congestion.    Respiratory:  Negative for hemoptysis, shortness of breath and stridor.    Cardiovascular:  Negative for chest pain.   Gastrointestinal:  Negative for abdominal pain.   Musculoskeletal:  Negative for neck pain.   Skin:  Negative for rash.   Neurological:  Negative for dizziness and headaches.     No Known Allergies    There are no problems to display for this patient.      Current Outpatient Medications Ordered in Epic   Medication Sig Dispense Refill    amoxicillin (AMOXIL) 500 MG Cap Take 1 Capsule by mouth 2 times a day for 10 days. 20 Capsule 0     Current Facility-Administered Medications Ordered in Epic   Medication Dose Route Frequency Provider Last Rate Last Admin    dexamethasone (Decadron) injection (check route below) 10 mg  10 mg Oral Once Dewayne Marquez, A.P.R.N.           No past surgical history on file.    Social History     Tobacco Use    Smoking status: Every Day     Current packs/day: 0.20     Average packs/day: 0.2 packs/day for 2.0 years (0.4 ttl pk-yrs)     Types: Cigarettes    Smokeless tobacco: Never   Vaping Use    Vaping Use: Never used   Substance Use Topics    Alcohol use: Yes     Alcohol/week: 10.8 oz     Types: 14 Glasses of wine, 4 Shots of liquor " "per week    Drug use: Not Currently     Frequency: 7.0 times per week     Types: Marijuana, Inhaled     Comment: marijuana, cocaine occ       family history is not on file.     Problem list, medications, and allergies reviewed by myself today in Epic.     Objective:   /82 (BP Location: Right arm, Patient Position: Sitting, BP Cuff Size: Adult)   Pulse (!) 102   Temp 37.3 °C (99.1 °F) (Temporal)   Resp 16   Ht 1.753 m (5' 9\")   Wt 82.2 kg (181 lb 3.2 oz)   SpO2 96%   BMI 26.76 kg/m²     Physical Exam  Vitals and nursing note reviewed.   Constitutional:       General: He is not in acute distress.     Appearance: Normal appearance. He is well-developed. He is not diaphoretic.   HENT:      Head: Normocephalic and atraumatic.      Right Ear: Hearing, tympanic membrane, ear canal and external ear normal. Tympanic membrane is not erythematous.      Left Ear: Hearing, tympanic membrane, ear canal and external ear normal. Tympanic membrane is not erythematous.      Nose: Nose normal. No congestion or rhinorrhea.      Mouth/Throat:      Dentition: Normal dentition. No dental caries.      Pharynx: Uvula midline. Pharyngeal swelling, oropharyngeal exudate and posterior oropharyngeal erythema present. No uvula swelling.      Tonsils: Tonsillar exudate present. No tonsillar abscesses. 3+ on the right. 3+ on the left.      Comments: No hot potato speech.   Eyes:      General: No scleral icterus.        Right eye: No discharge.         Left eye: No discharge.      Conjunctiva/sclera: Conjunctivae normal.   Neck:      Thyroid: No thyromegaly.      Trachea: No tracheal deviation.   Cardiovascular:      Rate and Rhythm: Normal rate and regular rhythm.      Heart sounds: Normal heart sounds. No murmur heard.  Pulmonary:      Effort: Pulmonary effort is normal. No respiratory distress.      Breath sounds: Normal breath sounds. No wheezing, rhonchi or rales.   Musculoskeletal:      Cervical back: Normal range of motion and " neck supple.      Right lower leg: No edema.      Left lower leg: No edema.   Lymphadenopathy:      Head:      Right side of head: Submandibular adenopathy present.      Left side of head: Submandibular adenopathy present.      Cervical: Cervical adenopathy present.      Right cervical: No superficial cervical adenopathy.     Left cervical: No superficial cervical adenopathy.   Skin:     General: Skin is warm and dry.      Findings: No erythema or rash.      Nails: There is no clubbing.   Neurological:      General: No focal deficit present.      Mental Status: He is alert and oriented to person, place, and time. Mental status is at baseline.   Psychiatric:         Mood and Affect: Mood normal.         Behavior: Behavior normal.         Thought Content: Thought content normal.         Judgment: Judgment normal.         Assessment/Plan:     Diagnosis and associated orders:   1. Tonsillitis with exudate  dexamethasone (Decadron) injection (check route below) 10 mg    amoxicillin (AMOXIL) 500 MG Cap    POCT GROUP A STREP, PCR         Comments/MDM:   Differential diagnosis discussed     23-year-old male presenting for reevaluation of worsening sore throat.  Was diagnosed with tonsillitis at the end of August only took 4 days of his amoxicillin because he lost the rest, since then has had worsening sore throat, impairing daily activities secondary to pain.  Has used ibuprofen.  Does have a pertinent history for strep throat never been hospitalized for it.  No red flag signs or symptoms.  He is normotensive, good oxygenation tachycardic at 102 which is concerning for systemic involvement.  Does appear well and nontoxic, normal ear nose findings, throat has symmetrical tonsillar enlargement the uvula is midline, he is managing oral secretions and has a clear voice, normal neck range of motion, no adventitious heart or lung sounds.  Given only 4 days of prior amoxicillin will send full course again we will also give 10 mg  dexamethasone in clinic given pain and swelling.  We did have long discussion about signs and symptoms that require immediate attention.  The patient verbalized understanding.  Increase fluids.  Continue with OTC medications for comfort.  Salt water gargling.    Return to clinic or go to ED if symptoms worsen or persist. Indications for ED discussed at length. Patient/Parent/Guardian voices understanding. Follow-up with your primary care provider in 3-5 days. Red flag symptoms discussed. All side effects of medication discussed including allergic response, GI upset, tendon injury, rash, sedation etc.    I personally reviewed prior external notes and test results pertinent to today's visit as well as additional imaging and testing completed in clinic today.     Please note that this dictation was created using voice recognition software. I have made every reasonable attempt to correct obvious errors, but I expect that there are errors of grammar and possibly content that I did not discover before finalizing the note.    This note was electronically signed by KRYSTAL Toribio

## 2024-01-08 ENCOUNTER — OFFICE VISIT (OUTPATIENT)
Dept: URGENT CARE | Facility: CLINIC | Age: 24
End: 2024-01-08
Payer: COMMERCIAL

## 2024-01-08 VITALS
OXYGEN SATURATION: 98 % | DIASTOLIC BLOOD PRESSURE: 80 MMHG | HEART RATE: 68 BPM | HEIGHT: 69 IN | WEIGHT: 181.6 LBS | TEMPERATURE: 98 F | BODY MASS INDEX: 26.9 KG/M2 | SYSTOLIC BLOOD PRESSURE: 122 MMHG | RESPIRATION RATE: 20 BRPM

## 2024-01-08 DIAGNOSIS — S39.012A STRAIN OF LUMBAR REGION, INITIAL ENCOUNTER: ICD-10-CM

## 2024-01-08 PROCEDURE — 99213 OFFICE O/P EST LOW 20 MIN: CPT | Performed by: NURSE PRACTITIONER

## 2024-01-08 PROCEDURE — 3079F DIAST BP 80-89 MM HG: CPT | Performed by: NURSE PRACTITIONER

## 2024-01-08 PROCEDURE — 3074F SYST BP LT 130 MM HG: CPT | Performed by: NURSE PRACTITIONER

## 2024-01-08 ASSESSMENT — FIBROSIS 4 INDEX: FIB4 SCORE: 0.56

## 2024-01-08 NOTE — LETTER
January 8, 2024       Patient: Antonio Briones   YOB: 2000   Date of Visit: 1/8/2024         To Whom It May Concern:    In my medical opinion, I recommend that Antonio Briones return to full duty, no restrictions. On 01/10/24              Sincerely,          ONI Giang  Electronically Signed

## 2024-01-08 NOTE — PROGRESS NOTES
"Antonio Briones is a 23 y.o. male who presents for Back Pain (Fail ice 1 day )      HPI  This is a new problem. Antonio Briones is a 23 y.o. patient who presents to urgent care with c/o: He fell on ice 2 days ago.  He fell onto his bottom causing his left side of his low back to be very painful.  He has a history of strained low back in the past.  He has been taking Tylenol and ibuprofen which helps a little.  Certain positions help alleviate his discomfort.  He missed work today.  He needs a note that he was here today and possibly to excuse him tomorrow if he needs an additional day.  Pain is similar to what he had in the past.  He notes that time only makes it better.  He denies numbness or tingling in his lower extremities.  He did not hit his head when he fell.  No other aggravating leaving factors.    ROS See HPI    Allergies:     No Known Allergies    PMSFS Hx:  History reviewed. No pertinent past medical history.  History reviewed. No pertinent surgical history.  History reviewed. No pertinent family history.  Social History     Tobacco Use    Smoking status: Every Day     Current packs/day: 0.20     Average packs/day: 0.2 packs/day for 2.0 years (0.4 ttl pk-yrs)     Types: Cigarettes    Smokeless tobacco: Never   Substance Use Topics    Alcohol use: Yes     Alcohol/week: 10.8 oz     Types: 14 Glasses of wine, 4 Shots of liquor per week       Problems:   There is no problem list on file for this patient.      Medications:   No current outpatient medications on file prior to visit.     No current facility-administered medications on file prior to visit.        Objective:     /80   Pulse 68   Temp 36.7 °C (98 °F) (Temporal)   Resp 20   Ht 1.753 m (5' 9\")   Wt 82.4 kg (181 lb 9.6 oz)   SpO2 98%   BMI 26.82 kg/m²     Physical Exam  Vitals and nursing note reviewed.   Constitutional:       General: He is not in acute distress.     Appearance: He is well-developed. He is not " toxic-appearing.   Cardiovascular:      Rate and Rhythm: Normal rate.      Pulses: Normal pulses.   Pulmonary:      Effort: Pulmonary effort is normal. No respiratory distress.   Musculoskeletal:      Cervical back: Normal range of motion and neck supple.      Lumbar back: Tenderness present. No bony tenderness. Decreased range of motion. Negative right straight leg raise test and negative left straight leg raise test.        Back:    Skin:     General: Skin is warm and dry.      Capillary Refill: Capillary refill takes less than 2 seconds.   Neurological:      General: No focal deficit present.      Mental Status: He is alert and oriented to person, place, and time.      Cranial Nerves: No cranial nerve deficit.      Sensory: Sensation is intact. No sensory deficit.      Motor: Motor function is intact.      Coordination: Coordination is intact.      Gait: Gait is intact.      Deep Tendon Reflexes:      Reflex Scores:       Patellar reflexes are 2+ on the right side and 2+ on the left side.     Comments: Low muscle strength 5/5    Psychiatric:         Speech: Speech normal.         Behavior: Behavior normal. Behavior is cooperative.         Thought Content: Thought content normal.         Assessment /Associated Orders:      1. Strain of lumbar region, initial encounter            Medical Decision Making:    Antonio is a very pleasant 23 y.o. male who is clinically stable at today's acute urgent care visit.  No acute distress noted.  VSS. Appropriate for outpatient care at this time.   Acute problem today with uncertain prognosis.   Work note provided for 1 or 2 days off depending on how he is feeling.   OTC  analgesic of choice (acetaminophen or NSAID) prn pain. Follow manufactures dosing and safety precautions.   Ice/ heat packs prn pain   OTC analgesic topical muscle/ joint cream prn pain. Dosage and directions per     Discussed Dx, management options (risks,benefits, and alternatives to planned  treatment), natural progression and supportive care.  Expressed understanding and the treatment plan was agreed upon.   Questions were encouraged and answered   Return to urgent care prn if new or worsening sx or if there is no improvement in condition prn.    Educated in Red flags and indications to immediately call 911 or present to the Emergency Department.           Please note that this dictation was created using voice recognition software. I have worked with consultants from the vendor as well as technical experts from [employer] [facility] to optimize the interface. I have made every reasonable attempt to correct obvious errors, but I expect that there are errors of grammar and possibly content that I did not discover before finalizing the note.  This note was electronically signed by provider

## 2024-01-08 NOTE — LETTER
January 8, 2024       Patient: Antonio Briones   YOB: 2000   Date of Visit: 1/8/2024         To Whom It May Concern:    In my medical opinion, I recommend that Antonio Briones return to full duty, no restrictions on 01/09/24            Sincerely,          NATHEN Giang.  Electronically Signed

## 2024-05-22 ENCOUNTER — TELEMEDICINE (OUTPATIENT)
Dept: TELEHEALTH | Facility: TELEMEDICINE | Age: 24
End: 2024-05-22
Payer: COMMERCIAL

## 2024-05-22 DIAGNOSIS — G89.29 EXACERBATION OF CHRONIC BACK PAIN: ICD-10-CM

## 2024-05-22 DIAGNOSIS — M54.9 EXACERBATION OF CHRONIC BACK PAIN: ICD-10-CM

## 2024-05-22 PROCEDURE — 99213 OFFICE O/P EST LOW 20 MIN: CPT | Performed by: PHYSICIAN ASSISTANT

## 2024-05-22 NOTE — LETTER
May 22, 2024         Patient: Antonio Briones   YOB: 2000   Date of Visit: 5/22/2024           To Whom it May Concern:    Antonio Briones was seen in my clinic on 5/22/2024. He should be excused from work today for medical reasons.    If you have any questions or concerns, please don't hesitate to call.        Sincerely,           Kirstie Moore P.A.-C.  Electronically Signed

## 2024-05-23 NOTE — PROGRESS NOTES
Virtual Visit: Established Patient   This visit was conducted via Zoom using secure and encrypted videoconferencing technology.   The patient was in a private location outside of their home in the [facility].    The patient's identity was confirmed and verbal consent was obtained for this virtual visit.     Subjective:   CC: No chief complaint on file.      Antonio Briones is a 23 y.o. male presenting for evaluation and management of:    Patient reports  2 days of back pain after lifting a weight. Denies work-related injury. Pain in left lumbar spine. He does have history of recurrent back pain. Pain does not radiates. Mild numbness in anterior leg. No urinary or bowel incontinence. Requesting a work note for today. OTC remedies do help.     ROS   See HPI    Current medicines (including changes today)  No current outpatient medications on file.     No current facility-administered medications for this visit.       There are no problems to display for this patient.       Objective:   There were no vitals taken for this visit.    Physical Exam:  Constitutional: Alert, no distress, well-groomed.  Skin: No rashes in visible areas.  Eye: Round. Conjunctiva clear, lids normal. No icterus.   ENMT: Lips pink without lesions, good dentition, moist mucous membranes. Phonation normal.  Neck: No masses, no thyromegaly. Moves freely without pain.  Respiratory: Unlabored respiratory effort, no cough or audible wheeze  Psych: Alert and oriented x3, normal affect and mood.     Assessment and Plan:   The following treatment plan was discussed:     1. Exacerbation of chronic back pain    Continue OTC remedies  Work note provided for today.    Follow-up: No follow-ups on file.        Kirstie Moore P.A.-C.

## 2024-05-30 ENCOUNTER — TELEMEDICINE (OUTPATIENT)
Dept: TELEHEALTH | Facility: TELEMEDICINE | Age: 24
End: 2024-05-30
Payer: COMMERCIAL

## 2024-05-30 DIAGNOSIS — M62.830 LUMBAR PARASPINAL MUSCLE SPASM: ICD-10-CM

## 2024-05-30 NOTE — LETTER
VIRTUAL VISIT  [facility]  [id number]     May 30, 2024    Patient: Antonio Briones   YOB: 2000   Date of Visit: 5/30/2024       To Whom It May Concern:    Antonio Briones was seen and treated in our department on 5/30/2024.  He should be excused from missed work for today.    Sincerely,     Gerry Carr P.A.-C.

## 2024-05-30 NOTE — PROGRESS NOTES
Virtual Visit: Established Patient   This visit was conducted via Zoom using secure and encrypted videoconferencing technology.   The patient was in a private location outside of their home in the [facility].    The patient's identity was confirmed and verbal consent was obtained for this virtual visit.     Subjective:   CC: Lumbar strain    Antonio Briones is a 23 y.o. male presenting for evaluation and management of:    Patient notes yesterday he lifted a heavy object and upon abruptly suddenly down had pain to back.  He describes a history of similar pain to back.  He states this morning upon waking he had worsened pain in the back.  He denies a history of surgery or injections to back.  He denies numbness tingling or weakness.  He complains of pain that radiates down low back and slightly in the upper leg.  He has been using OTC pain relievers ice heat and stretching and feels as though it is improving.  Patient contacted clinic for telemedicine evaluation requesting work excuse note.    ROS   Positive for back pain  Negative for numbness tingling or weakness  Negative for fever or rash    Current medicines (including changes today)  No current outpatient medications on file.     No current facility-administered medications for this visit.       There are no problems to display for this patient.       Objective:   There were no vitals taken for this visit.    Physical Exam:  Constitutional: Alert, no distress, well-groomed.  Skin: No rashes in visible areas.  Eye: Round. Conjunctiva clear, lids normal. No icterus.   ENMT: Lips pink without lesions, good dentition, moist mucous membranes. Phonation normal.  Neck: No masses, no thyromegaly. Moves freely without pain.  Respiratory: Unlabored respiratory effort, no cough or audible wheeze  Psych: Alert and oriented x3, normal affect and mood.     Assessment and Plan:   The following treatment plan was discussed:     1. Lumbar paraspinal muscle  spasm  Recommend continued conservative care measures of ice heat stretching and OTC pain relievers.  Patient sent with a work excuse note.  Recommend in clinic evaluation with worsened or continued symptoms.    Follow-up: Follow up for failure of sx to resolve or with any questions or concerns.

## 2024-06-20 ENCOUNTER — TELEMEDICINE (OUTPATIENT)
Dept: TELEHEALTH | Facility: TELEMEDICINE | Age: 24
End: 2024-06-20
Payer: COMMERCIAL

## 2024-06-20 DIAGNOSIS — J06.9 UPPER RESPIRATORY TRACT INFECTION, UNSPECIFIED TYPE: ICD-10-CM

## 2024-06-20 PROCEDURE — 99213 OFFICE O/P EST LOW 20 MIN: CPT | Mod: 95

## 2024-06-20 NOTE — LETTER
June 20, 2024    To Whom It May Concern:         This is confirmation that Antonioleo Dumont Briones attended his scheduled appointment with [facility] on 6/20/24.         If you have any questions please do not hesitate to call me at the phone number listed below.    Sincerely,          Juana Rodrigez A.P.R.N.  [phone]

## 2024-06-20 NOTE — PROGRESS NOTES
Virtual Visit: New Patient   This visit was conducted via ZOOM using secure and encrypted videoconferencing technology.   The patient was in their home in the state of [place].    The patient's identity was confirmed and verbal consent was obtained for this virtual visit.     Subjective:   Antonio Briones is a 23 y.o. male presenting to for a doctors note due to calling out sick today.  He woke up with a sore throat, mild cough and runny nose.  He denies any SOB or fevers, he has not had any body aches or headache         Objective:   There were no vitals taken for this visit.    Physical Exam: Not performed due to nature of virtual visit  Constitutional: Alert, no distress, well-groomed.  Skin: No rashes in visible areas.  Eye: Round. Conjunctiva clear, lids normal. No icterus.   ENMT: Lips pink without lesions, Mucous membranes appear moist. Phonation normal.  Neck: Moves freely without pain.  Respiratory: Unlabored respiratory effort, no cough or audible wheeze  Psych: Alert and oriented x3, normal affect and mood.     Assessment and Plan:     1. Upper respiratory tract infection, unspecified type      IMPRESSION: Pt is non-toxic and suitable for virtual outpt care at this time.  He has URI like symptoms and needs a note for calling out sick to work today..informed pt that his symptoms are consistent with a viral illness and are self limiting.  Informed that no antibiotics are indicated at this time.  Educated on duration of illness and indications to RTC.  Recommended supportive therapies and preferred OTC medications for symptomatic relief.  If he has no resolvution of his sore throat, he is adivsed to be seen in clinic for strep swabbing    Patient advised that due to the nature of this visit there is some diagnostic uncertainty. Differential diagnosis discussed. Pt was Educated on red flag symptoms. Pt has been Instructed to return to Urgent Care or nearest Emergency Department if symptoms fail to improve,  for any change in condition, further concerns, or new concerning symptoms. Patient states understanding of the plan of care and discharge instructions.  They are discharged in stable condition.       Please note that this dictation was created using voice recognition software. I have made a reasonable attempt to correct obvious errors, but I expect that there are errors of grammar and possibly content that I did not discover before finalizing the note.    This note was electronically signed by KRYSTAL Cooper

## 2024-07-30 ENCOUNTER — OFFICE VISIT (OUTPATIENT)
Dept: URGENT CARE | Facility: CLINIC | Age: 24
End: 2024-07-30
Payer: COMMERCIAL

## 2024-07-30 VITALS
RESPIRATION RATE: 16 BRPM | DIASTOLIC BLOOD PRESSURE: 76 MMHG | HEIGHT: 69 IN | SYSTOLIC BLOOD PRESSURE: 114 MMHG | TEMPERATURE: 98.6 F | OXYGEN SATURATION: 94 % | WEIGHT: 178 LBS | BODY MASS INDEX: 26.36 KG/M2 | HEART RATE: 75 BPM
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
## 2024-07-30 DIAGNOSIS — J02.9 PHARYNGITIS, UNSPECIFIED ETIOLOGY: ICD-10-CM

## 2024-07-30 DIAGNOSIS — S10.96XA INSECT BITE OF NECK, INITIAL ENCOUNTER: ICD-10-CM

## 2024-07-30 RX ORDER — DEXAMETHASONE SODIUM PHOSPHATE 10 MG/ML
10 INJECTION INTRAMUSCULAR; INTRAVENOUS ONCE
Status: COMPLETED | OUTPATIENT
Start: 2024-07-30 | End: 2024-07-30

## 2024-07-30 RX ORDER — METHYLPREDNISOLONE 4 MG/1
TABLET ORAL
Qty: 21 TABLET | Refills: 0 | Status: SHIPPED | OUTPATIENT
Start: 2024-07-30

## 2024-07-30 RX ORDER — TRIAMCINOLONE ACETONIDE 1 MG/G
1 OINTMENT TOPICAL 2 TIMES DAILY
Qty: 30 G | Refills: 0 | Status: SHIPPED | OUTPATIENT
Start: 2024-07-30 | End: 2024-08-06

## 2024-07-30 RX ADMIN — DEXAMETHASONE SODIUM PHOSPHATE 10 MG: 10 INJECTION INTRAMUSCULAR; INTRAVENOUS at 09:02

## 2024-07-30 ASSESSMENT — ENCOUNTER SYMPTOMS
DIARRHEA: 0
NAUSEA: 0
CHILLS: 0
VOMITING: 0
FEVER: 0
CONSTIPATION: 0
BLOOD IN STOOL: 0
ABDOMINAL PAIN: 0